# Patient Record
Sex: MALE | Race: WHITE | Employment: OTHER | URBAN - METROPOLITAN AREA
[De-identification: names, ages, dates, MRNs, and addresses within clinical notes are randomized per-mention and may not be internally consistent; named-entity substitution may affect disease eponyms.]

---

## 2019-12-10 ENCOUNTER — OFFICE VISIT (OUTPATIENT)
Dept: PHYSICAL MEDICINE AND REHAB | Age: 34
End: 2019-12-10
Payer: MEDICARE

## 2019-12-10 VITALS
BODY MASS INDEX: 26.01 KG/M2 | SYSTOLIC BLOOD PRESSURE: 104 MMHG | HEIGHT: 72 IN | DIASTOLIC BLOOD PRESSURE: 72 MMHG | WEIGHT: 192 LBS

## 2019-12-10 DIAGNOSIS — G89.29 CHRONIC BILATERAL THORACIC BACK PAIN: ICD-10-CM

## 2019-12-10 DIAGNOSIS — R25.2 SPASTICITY: ICD-10-CM

## 2019-12-10 DIAGNOSIS — N31.9 NEUROGENIC BLADDER: ICD-10-CM

## 2019-12-10 DIAGNOSIS — N32.81 OAB (OVERACTIVE BLADDER): ICD-10-CM

## 2019-12-10 DIAGNOSIS — F32.1 CURRENT MODERATE EPISODE OF MAJOR DEPRESSIVE DISORDER, UNSPECIFIED WHETHER RECURRENT (HCC): ICD-10-CM

## 2019-12-10 DIAGNOSIS — M54.42 CHRONIC BILATERAL LOW BACK PAIN WITH BILATERAL SCIATICA: Primary | ICD-10-CM

## 2019-12-10 DIAGNOSIS — Z79.899 HIGH RISK MEDICATION USE: ICD-10-CM

## 2019-12-10 DIAGNOSIS — G89.29 CHRONIC BILATERAL LOW BACK PAIN WITH BILATERAL SCIATICA: Primary | ICD-10-CM

## 2019-12-10 DIAGNOSIS — G89.29 CHRONIC PAIN OF BOTH SHOULDERS: ICD-10-CM

## 2019-12-10 DIAGNOSIS — G35 MULTIPLE SCLEROSIS (HCC): ICD-10-CM

## 2019-12-10 DIAGNOSIS — F17.200 SMOKER: ICD-10-CM

## 2019-12-10 DIAGNOSIS — M54.6 CHRONIC BILATERAL THORACIC BACK PAIN: ICD-10-CM

## 2019-12-10 DIAGNOSIS — E55.9 VITAMIN D DEFICIENCY: ICD-10-CM

## 2019-12-10 DIAGNOSIS — M79.10 MYALGIA: ICD-10-CM

## 2019-12-10 DIAGNOSIS — M54.41 CHRONIC BILATERAL LOW BACK PAIN WITH BILATERAL SCIATICA: Primary | ICD-10-CM

## 2019-12-10 DIAGNOSIS — M25.511 CHRONIC PAIN OF BOTH SHOULDERS: ICD-10-CM

## 2019-12-10 DIAGNOSIS — M25.512 CHRONIC PAIN OF BOTH SHOULDERS: ICD-10-CM

## 2019-12-10 PROBLEM — M54.40 CHRONIC BILATERAL LOW BACK PAIN WITH SCIATICA: Status: ACTIVE | Noted: 2019-12-10

## 2019-12-10 PROBLEM — R68.82 LOW LIBIDO: Status: ACTIVE | Noted: 2018-06-15

## 2019-12-10 PROCEDURE — 99205 OFFICE O/P NEW HI 60 MIN: CPT | Performed by: PHYSICAL MEDICINE & REHABILITATION

## 2019-12-10 RX ORDER — METHOCARBAMOL 500 MG/1
500 TABLET, FILM COATED ORAL
COMMUNITY
Start: 2019-08-21 | End: 2019-12-10

## 2019-12-10 RX ORDER — TIZANIDINE 4 MG/1
4 TABLET ORAL
COMMUNITY
Start: 2018-04-16 | End: 2019-12-10

## 2019-12-10 SDOH — HEALTH STABILITY: MENTAL HEALTH
STRESS IS WHEN SOMEONE FEELS TENSE, NERVOUS, ANXIOUS, OR CAN'T SLEEP AT NIGHT BECAUSE THEIR MIND IS TROUBLED. HOW STRESSED ARE YOU?: RATHER MUCH

## 2019-12-10 SDOH — ECONOMIC STABILITY: TRANSPORTATION INSECURITY
IN THE PAST 12 MONTHS, HAS THE LACK OF TRANSPORTATION KEPT YOU FROM MEDICAL APPOINTMENTS OR FROM GETTING MEDICATIONS?: YES

## 2019-12-10 SDOH — SOCIAL STABILITY: SOCIAL INSECURITY
WITHIN THE LAST YEAR, HAVE YOU BEEN KICKED, HIT, SLAPPED, OR OTHERWISE PHYSICALLY HURT BY YOUR PARTNER OR EX-PARTNER?: NO

## 2019-12-10 SDOH — SOCIAL STABILITY: SOCIAL NETWORK: IN A TYPICAL WEEK, HOW MANY TIMES DO YOU TALK ON THE PHONE WITH FAMILY, FRIENDS, OR NEIGHBORS?: TWICE A WEEK

## 2019-12-10 SDOH — SOCIAL STABILITY: SOCIAL NETWORK
DO YOU BELONG TO ANY CLUBS OR ORGANIZATIONS SUCH AS CHURCH GROUPS UNIONS, FRATERNAL OR ATHLETIC GROUPS, OR SCHOOL GROUPS?: NO

## 2019-12-10 SDOH — SOCIAL STABILITY: SOCIAL NETWORK: HOW OFTEN DO YOU ATTEND CHURCH OR RELIGIOUS SERVICES?: NEVER

## 2019-12-10 SDOH — ECONOMIC STABILITY: TRANSPORTATION INSECURITY
IN THE PAST 12 MONTHS, HAS LACK OF TRANSPORTATION KEPT YOU FROM MEETINGS, WORK, OR FROM GETTING THINGS NEEDED FOR DAILY LIVING?: YES

## 2019-12-10 SDOH — ECONOMIC STABILITY: FOOD INSECURITY: WITHIN THE PAST 12 MONTHS, YOU WORRIED THAT YOUR FOOD WOULD RUN OUT BEFORE YOU GOT MONEY TO BUY MORE.: SOMETIMES TRUE

## 2019-12-10 SDOH — ECONOMIC STABILITY: FOOD INSECURITY: WITHIN THE PAST 12 MONTHS, THE FOOD YOU BOUGHT JUST DIDN'T LAST AND YOU DIDN'T HAVE MONEY TO GET MORE.: SOMETIMES TRUE

## 2019-12-10 SDOH — SOCIAL STABILITY: SOCIAL INSECURITY
WITHIN THE LAST YEAR, HAVE TO BEEN RAPED OR FORCED TO HAVE ANY KIND OF SEXUAL ACTIVITY BY YOUR PARTNER OR EX-PARTNER?: NO

## 2019-12-10 SDOH — SOCIAL STABILITY: SOCIAL NETWORK: HOW OFTEN DO YOU GET TOGETHER WITH FRIENDS OR RELATIVES?: TWICE A WEEK

## 2019-12-10 SDOH — SOCIAL STABILITY: SOCIAL NETWORK: ARE YOU MARRIED, WIDOWED, DIVORCED, SEPARATED, NEVER MARRIED, OR LIVING WITH A PARTNER?: NEVER MARRIED

## 2019-12-10 SDOH — SOCIAL STABILITY: SOCIAL INSECURITY: WITHIN THE LAST YEAR, HAVE YOU BEEN HUMILIATED OR EMOTIONALLY ABUSED IN OTHER WAYS BY YOUR PARTNER OR EX-PARTNER?: NO

## 2019-12-10 SDOH — HEALTH STABILITY: PHYSICAL HEALTH: ON AVERAGE, HOW MANY DAYS PER WEEK DO YOU ENGAGE IN MODERATE TO STRENUOUS EXERCISE (LIKE A BRISK WALK)?: 3 DAYS

## 2019-12-10 SDOH — HEALTH STABILITY: MENTAL HEALTH: HOW OFTEN DO YOU HAVE A DRINK CONTAINING ALCOHOL?: NOT ASKED

## 2019-12-10 SDOH — SOCIAL STABILITY: SOCIAL INSECURITY: WITHIN THE LAST YEAR, HAVE YOU BEEN AFRAID OF YOUR PARTNER OR EX-PARTNER?: NO

## 2019-12-10 SDOH — HEALTH STABILITY: PHYSICAL HEALTH: ON AVERAGE, HOW MANY MINUTES DO YOU ENGAGE IN EXERCISE AT THIS LEVEL?: 10 MIN

## 2019-12-10 SDOH — SOCIAL STABILITY: SOCIAL NETWORK: HOW OFTEN DO YOU ATTENT MEETINGS OF THE CLUB OR ORGANIZATION YOU BELONG TO?: NEVER

## 2019-12-10 SDOH — ECONOMIC STABILITY: INCOME INSECURITY: HOW HARD IS IT FOR YOU TO PAY FOR THE VERY BASICS LIKE FOOD, HOUSING, MEDICAL CARE, AND HEATING?: VERY HARD

## 2019-12-10 ASSESSMENT — ENCOUNTER SYMPTOMS
NAUSEA: 0
WHEEZING: 0
ABDOMINAL PAIN: 0
BACK PAIN: 1
SHORTNESS OF BREATH: 0
CONSTIPATION: 0
CHEST TIGHTNESS: 0
ALLERGIC/IMMUNOLOGIC NEGATIVE: 1
DIARRHEA: 0
PHOTOPHOBIA: 0
EYE PAIN: 0
VISUAL CHANGE: 1
BOWEL INCONTINENCE: 0
VOMITING: 0
COUGH: 0

## 2019-12-12 ENCOUNTER — HOSPITAL ENCOUNTER (OUTPATIENT)
Dept: OCCUPATIONAL THERAPY | Age: 34
Setting detail: THERAPIES SERIES
End: 2019-12-12
Payer: MEDICARE

## 2019-12-12 LAB
AMPHETAMINES SCREEN BLOOD: NEGATIVE NG/ML
BARBITURATES SCREEN BLOOD: NEGATIVE NG/ML
BENZODIAZEPINES SCREEN BLOOD: NEGATIVE NG/ML
BUPRENORPHINE: NEGATIVE NG/ML
CANNABINOID SCREEN BLOOD: NEGATIVE NG/ML
COCAINE SCREEN BLOOD: NEGATIVE NG/ML
Lab: NORMAL
METHADONE SCREEN BLOOD: NEGATIVE NG/ML
METHAMPHETAMINES SERUM/ PLASMA: NEGATIVE NG/ML
OPIATES SCREEN BLOOD: NEGATIVE NG/ML
OXYCODONE: NEGATIVE NG/ML
PHENCYCLIDINE SCREEN BLOOD: NEGATIVE NG/ML

## 2019-12-18 ENCOUNTER — HOSPITAL ENCOUNTER (OUTPATIENT)
Dept: OCCUPATIONAL THERAPY | Age: 34
Setting detail: THERAPIES SERIES
Discharge: HOME | End: 2019-12-18
Payer: MEDICARE

## 2019-12-18 PROCEDURE — 97140 MANUAL THERAPY 1/> REGIONS: CPT

## 2019-12-18 PROCEDURE — 97166 OT EVAL MOD COMPLEX 45 MIN: CPT

## 2019-12-26 ENCOUNTER — HOSPITAL ENCOUNTER (OUTPATIENT)
Dept: OCCUPATIONAL THERAPY | Age: 34
Setting detail: THERAPIES SERIES
Discharge: HOME | End: 2019-12-26
Payer: MEDICARE

## 2019-12-26 PROCEDURE — 97140 MANUAL THERAPY 1/> REGIONS: CPT

## 2019-12-26 ASSESSMENT — PAIN SCALES - GENERAL: PAINLEVEL_OUTOF10: 3

## 2019-12-30 ENCOUNTER — HOSPITAL ENCOUNTER (OUTPATIENT)
Dept: OCCUPATIONAL THERAPY | Age: 34
Setting detail: THERAPIES SERIES
Discharge: HOME | End: 2019-12-30
Payer: MEDICARE

## 2019-12-30 PROCEDURE — 97140 MANUAL THERAPY 1/> REGIONS: CPT

## 2020-01-02 ENCOUNTER — OFFICE VISIT (OUTPATIENT)
Dept: PHYSICAL MEDICINE AND REHAB | Age: 35
End: 2020-01-02
Payer: MEDICARE

## 2020-01-02 VITALS
HEIGHT: 72 IN | DIASTOLIC BLOOD PRESSURE: 62 MMHG | BODY MASS INDEX: 26.41 KG/M2 | WEIGHT: 195 LBS | SYSTOLIC BLOOD PRESSURE: 98 MMHG

## 2020-01-02 PROBLEM — G57.93 NEUROPATHIC PAIN OF BOTH LEGS: Status: ACTIVE | Noted: 2020-01-02

## 2020-01-02 PROBLEM — Z79.899 HIGH RISK MEDICATION USE: Status: ACTIVE | Noted: 2020-01-02

## 2020-01-02 PROCEDURE — 99215 OFFICE O/P EST HI 40 MIN: CPT | Performed by: PHYSICAL MEDICINE & REHABILITATION

## 2020-01-02 PROCEDURE — G8427 DOCREV CUR MEDS BY ELIG CLIN: HCPCS | Performed by: PHYSICAL MEDICINE & REHABILITATION

## 2020-01-02 PROCEDURE — 4004F PT TOBACCO SCREEN RCVD TLK: CPT | Performed by: PHYSICAL MEDICINE & REHABILITATION

## 2020-01-02 PROCEDURE — G8419 CALC BMI OUT NRM PARAM NOF/U: HCPCS | Performed by: PHYSICAL MEDICINE & REHABILITATION

## 2020-01-02 PROCEDURE — G8484 FLU IMMUNIZE NO ADMIN: HCPCS | Performed by: PHYSICAL MEDICINE & REHABILITATION

## 2020-01-02 RX ORDER — OXYCODONE AND ACETAMINOPHEN 7.5; 325 MG/1; MG/1
1 TABLET ORAL 2 TIMES DAILY
Qty: 60 TABLET | Refills: 0 | Status: SHIPPED | OUTPATIENT
Start: 2020-01-02 | End: 2020-01-22 | Stop reason: SDUPTHER

## 2020-01-02 RX ORDER — OXYCODONE HYDROCHLORIDE AND ACETAMINOPHEN 5; 325 MG/1; MG/1
5 TABLET ORAL 2 TIMES DAILY
COMMUNITY
Start: 2019-12-19 | End: 2020-01-02 | Stop reason: ALTCHOICE

## 2020-01-02 RX ORDER — TOPIRAMATE 25 MG/1
25 TABLET ORAL NIGHTLY
Qty: 60 TABLET | Refills: 3 | Status: SHIPPED | OUTPATIENT
Start: 2020-01-02 | End: 2020-05-27 | Stop reason: SDUPTHER

## 2020-01-02 ASSESSMENT — ENCOUNTER SYMPTOMS
SHORTNESS OF BREATH: 0
ALLERGIC/IMMUNOLOGIC NEGATIVE: 1
VOMITING: 0
NAUSEA: 0
BOWEL INCONTINENCE: 0
CHEST TIGHTNESS: 0
VISUAL CHANGE: 1
DIARRHEA: 0
PHOTOPHOBIA: 0
WHEEZING: 0
EYE PAIN: 0
BACK PAIN: 1
COUGH: 0
ABDOMINAL PAIN: 0
CONSTIPATION: 0

## 2020-01-02 NOTE — PROGRESS NOTES
Subjective  Marlena Felty, 36 y.o. male presents today with:    Chief Complaint           Follow-up lab results normal     Medication Refill pt did not bring meds    Neurologic Problem MS pain-Percocets helping       67-year-old male with severe progressive MS unfortunately has degenerative joint and back pain in the mid and low back as well as neuropathic pain in his hands and feet. He has tried several things over the years including multiple medications steroids injections practic therapy. He has had occupational therapy and PT but no myofascial release. He seen by the [facility] and is in a study for tuSabri and therefore cannot have steroids. He has overused NSAIDs out of desperation to get relief in the past but is never overused any type of controlled medication. He has seen a medical marijuana practice in the past and trialed CBD and THC with very limited success regarding his pain or MS symptoms. The THC at one time did help his optic neuritis however there is no longer an issue for him. Tox screen was unremarkable because of his severe disease and palliative nature of the pain medications he was given a short-term dosing over the Nicolette holiday by covering physician he denies any overuse or abuse however he did not bring his medication and in the original bottles as instructed. He will be be given a warning if this happens again he may be taken off his medications. We had a long discussion about what types of injections may help we will avoid steroids due to trigger points in the left sciatic. We will also trial OT myofascial release check a tox screen and then consider medications to include possibly Topamax and or Percocet which is helped in the past.  He is also to seek counseling regarding his depression and adjustment to disability disorder. He will look in his insurance book an online to see who takes his insurance which is Medicaid and Medicare.   He again will check with his Spouse name: None    Number of children: None    Years of education: 15    Highest education level: Some college, no degree   Occupational History    Occupation: disabled dt MS   Social Needs    Financial resource strain: Very hard    Food insecurity:     Worry: Sometimes true     Inability: Sometimes true    Transportation needs:     Medical: Yes     Non-medical: Yes   Tobacco Use    Smoking status: Current Every Day Smoker     Packs/day: 1.00    Smokeless tobacco: Never Used   Substance and Sexual Activity    Alcohol use: Not Currently    Drug use: Never    Sexual activity: None   Lifestyle    Physical activity:     Days per week: 3 days     Minutes per session: 10 min    Stress: Rather much   Relationships    Social connections:     Talks on phone: Twice a week     Gets together: Twice a week     Attends Rastafarian service: Never     Active member of club or organization: No     Attends meetings of clubs or organizations: Never     Relationship status: Never     Intimate partner violence:     Fear of current or ex partner: No     Emotionally abused: No     Physically abused: No     Forced sexual activity: No   Other Topics Concern    None   Social History Narrative    Social/Functional History     on SSDI for MS    Lives in [facility] with his twin brother and long term friend-Madan. He still pays most the bills. He was taken advantage of by the situation. He grew up in [place] but is no longer Rastafarian and is agnostic if not in [place].    Still able to drive-but can't afford a car. 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 No family history on file. Allergies   Allergen Reactions    Prednisone Other (See Comments)     Cant have dt Lynn Alvarado study he is in with [facility]       Review of Systems   Constitutional: Positive for fatigue. Pupils are equal, round, and reactive to light. Neck:      Musculoskeletal: Normal range of motion and neck supple. No edema or neck rigidity. Thyroid: No thyromegaly. Vascular: No JVD. Trachea: No tracheal deviation. Cardiovascular:      Pulses: No decreased pulses. Pulmonary:      Effort: Pulmonary effort is normal. No tachypnea, bradypnea, accessory muscle usage or respiratory distress. Breath sounds: No wheezing. Chest:      Chest wall: No tenderness. Abdominal:      General: Bowel sounds are normal. There is no distension. Palpations: Abdomen is soft. There is no mass. Tenderness: There is no tenderness. There is no guarding or rebound. Musculoskeletal:         General: Tenderness present. Right shoulder: Normal.      Left shoulder: Normal.      Right elbow: Normal.     Left elbow: Normal.      Right wrist: Normal.      Left wrist: Normal.      Right hip: Normal.      Left hip: Normal.      Right knee: Normal.      Left knee: Normal.      Right ankle: Normal. Achilles tendon normal.      Left ankle: Normal. Achilles tendon normal.      Cervical back: He exhibits decreased range of motion, tenderness and bony tenderness. Thoracic back: He exhibits decreased range of motion, tenderness and bony tenderness. Lumbar back: He exhibits decreased range of motion, tenderness, bony tenderness and pain. He exhibits no swelling, no edema, no deformity, no laceration and normal pulse. Back:       Right upper arm: Normal.      Left upper arm: Normal.      Right forearm: Normal.      Left forearm: Normal.      Right hand: Normal.      Left hand: Normal.      Right upper leg: Normal.      Left upper leg: Normal.      Right lower leg: Normal.      Left lower leg: Normal.        Legs:       Right foot: Normal.      Left foot: Normal.      Comments: Tender areas are indicated by numbered spot         Lymphadenopathy:      Cervical: No cervical adenopathy.    Skin: General: Skin is warm and dry. Coloration: Skin is not pale. Findings: No abrasion, bruising, ecchymosis, erythema, laceration, petechiae or rash. Rash is not macular, pustular or urticarial.      Nails: There is no clubbing. Neurological:      Mental Status: He is alert and oriented to person, place, and time. Cranial Nerves: No cranial nerve deficit. Sensory: No sensory deficit. Motor: No tremor, atrophy or abnormal muscle tone. Coordination: Coordination normal. Finger-Nose-Finger Test abnormal.      Gait: Tandem walk abnormal. Gait normal.      Deep Tendon Reflexes: Reflexes abnormal. Babinski sign absent on the right side. Babinski sign absent on the left side. Reflex Scores:       Tricep reflexes are 1+ on the right side and 1+ on the left side. Bicep reflexes are 1+ on the right side and 1+ on the left side. Brachioradialis reflexes are 1+ on the right side and 1+ on the left side. Patellar reflexes are 3+ on the right side and 3+ on the left side. Achilles reflexes are 3+ on the right side and 3+ on the left side. Psychiatric:         Attention and Perception: He is attentive. Mood and Affect: Mood is not anxious or depressed. Affect is not labile, blunt, angry or inappropriate. Speech: He is communicative. Speech is not rapid and pressured, delayed, slurred or tangential.         Behavior: Behavior normal. Behavior is not agitated, slowed, aggressive, withdrawn, hyperactive or combative. Thought Content: Thought content normal. Thought content is not paranoid or delusional. Thought content does not include homicidal or suicidal ideation. Thought content does not include homicidal or suicidal plan. Cognition and Memory: Memory is not impaired. He does not exhibit impaired recent memory or impaired remote memory. Judgment: Judgment normal. Judgment is not impulsive or inappropriate.        Ortho Exam  Neurologic Exam     Mental Status   Oriented to person, place, and time. Follows 2 step commands. Attention: normal. Concentration: normal.   Speech: not slurred   Level of consciousness: alert  Knowledge: good. Able to name object. Able to read. Able to repeat. Able to write. Normal comprehension. Cranial Nerves     CN III, IV, VI   Pupils are equal, round, and reactive to light. Gait, Coordination, and Reflexes     Coordination   Finger to nose coordination: abnormal  Tandem walking coordination: abnormal    Reflexes   Right brachioradialis: 1+  Left brachioradialis: 1+  Right biceps: 1+  Left biceps: 1+  Right triceps: 1+  Left triceps: 1+  Right patellar: 3+  Left patellar: 3+  Right achilles: 3+  Left achilles: 3+          After a thorough review and discussion of the previous medical records, patient comprehensive medical, surgical, and family and social history, Review of Systems, their OARRS, their Screener and Opioid Assessment for Patients with Pain (SOAPP®-R), recent diagnostics, and symptomatic results to previous treatment, it is my impression that the patients is suffering with progressive and severe:     Diagnosis Orders   1. Multiple sclerosis (HCC)  oxyCODONE-acetaminophen (PERCOCET) 7.5-325 MG per tablet    topiramate (TOPAMAX) 25 MG tablet   2. Neurogenic bladder  topiramate (TOPAMAX) 25 MG tablet   3. Vitamin D deficiency     4. Spasticity     5. OAB (overactive bladder)     6. Current moderate episode of major depressive disorder, unspecified whether recurrent ([facility] [address]  oxyCODONE-acetaminophen (PERCOCET) 7.5-325 MG per tablet   7. Chronic pain of both shoulders  oxyCODONE-acetaminophen (PERCOCET) 7.5-325 MG per tablet    topiramate (TOPAMAX) 25 MG tablet   8. Chronic bilateral thoracic back pain  oxyCODONE-acetaminophen (PERCOCET) 7.5-325 MG per tablet   9. Chronic bilateral low back pain with bilateral sciatica  oxyCODONE-acetaminophen (PERCOCET) 7.5-325 MG per tablet   10. Neuropathic pain of both legs  topiramate (TOPAMAX) 25 MG tablet   11. High risk medication use  oxyCODONE-acetaminophen (PERCOCET) 7.5-325 MG per tablet    topiramate (TOPAMAX) 25 MG tablet       I am also concerned by lifestyle and mood issues including:    Past Medical History:   Diagnosis Date    Chronic bilateral low back pain with sciatica 12/10/2019    Mild L4-5 degenerative disc disease and minimal facet degenerative  changes at L5-S1.  Otherwise normal lumbosacral spine. Mild remote benign lower thoracic compression fractures and mild to  moderate thoracic degenerative disc disease.  Erectile dysfunction     Multiple sclerosis ([facility] [address] 12/10/2019    Sees [facility] Date of onset: August 2006-Left optic neuritis Date of diagnosis of MS: September 2006-Diagnosed by Dr. Charles Evans course at onset: Relapsing-Remitting Current disease course: Relapsing-Remitting Previous disease therapies:  IVMP August 2006 Copaxone-September 2006-January 2007 (stopped due to expense) IVMP July 2008 (optic neuritis on right) IVMP November 6-8, 2015 (Left hand weaknes    OAB (overactive bladder) 6/15/2018    Spasticity 9/15/2015           Given their medication, chronic pain and lifestyle and medications they are at risk for :    Falls, constipation, addiction  Loss of livelyhood due to severe pain, debility, weight gain and  vitamin D deficiency    The patient was educated regarding proper diet, fitness routine, and regulatory restrictions concerning pain medications. Previous notes, comprehensive past medical, surgical, family history, and diagnostics were reviewed. Patient education and councelling were provided regarding off label use,treatment options and medication and injection risks.     Current and old OARRS ([place] Automated Prescription Reporting System) records reviewed, all refills reviewed since last visit,  Behavioral agreement/RALPH regulations   and Toxicology screen was reviewed with patient and is up to oxyCODONE-acetaminophen (PERCOCET) 7.5-325 MG per tablet     Sig: Take 1 tablet by mouth 2 times daily for 30 days. Intended supply: 30 days     Dispense:  60 tablet     Refill:  0     Reduce doses taken as pain becomes manageable    topiramate (TOPAMAX) 25 MG tablet     Sig: Take 1 tablet by mouth nightly     Dispense:  60 tablet     Refill:  3        -which helps with pain and function. Otherwise, continue the current pain medications that I have prescibed. Radiologic:   Old films reviewed  IMPRESSION:    1.  Stable changes of multiple sclerosis. 2.  BRAIN: Severity: Mild-to-moderate.  Volume decrease: Mild.  New   plaques: None.  Enhancement: None. 3.  No acute intracranial disease. 4.  CORD: Severity: Moderate to severe.  Volume decrease: None.  New   plaques: None.  Enhancement: None. 5.  Patent cervical canal and neural foramina. Anatomic variant: None.  C2-3 is considered superior most disc. Assume 7   cervical-type vertebrae.,     I discussed results with patients. see Follow up plans below  For any new studies. Care Everywhere Updates:  requested and reviewed. IRB : A Randomized, Controlled, Open-Label, Rater-Blinded, Phase 3b Study of the Efficacy, Safety, and Tolerability of 6-Week Extended Interval Dosing of Natalizumab ([id number]) in Subjects With Relapsing-Remitting Multiple Sclerosis Switching From Treatment With 4-Week Natalizumab Standard Interval Dosing (RAJNI) in Relation to Continued RAJNI Treatment    : Dr. Marybel Isabel MD    Week 24, Day 168 (-2/+5)  1251 - /78, P 74, RR: 16, Tympanic Temp 36 C (Pre-dose vitals)  X Patient administered 300mg natalizumab IV  1253- Infusion start time. 1346 - Infusion stop time, started NaCl at 125 ml/hr to flush line. 1356 - End Administration Time. 20ml given of NaCl. 131 ml given of research medication    No new issues noted.           Electrodiagnostic:  Previous studies requested,     I discussed results with patient. See follow-up plans for new studies. Labs:  Previous labs reviewed     No results found for: NA, K, CL, CO2, BUN, CREATININE, CALCIUM, LABALBU, BILITOT, ALKPHOS, AST, ALT  No results found for: WBC, RBC, HGB, HCT, MCV, MCH, MCHC, RDW, PLT, MPV    Lab Results   Component Value Date    DSCOMMENT See Note 12/10/2019       Lab Results   Component Value Date    BUPREN Negative 12/10/2019       No results found for: METPHEN, PHENTERMINE, BENZOYL, ALPRAZ, ALPHAOHALPRA, CLONAZEPAM, 7AMINOCLONAZ, DIAZEP, SAWYER, OXAZ, TEMAZ, LORAZEPAM, MIDAZOLAM, ZOLPIDEM, GRAHAM, ETG, MARIJMET, PCP, PAINMGTDRUGP, EERPAINMGTPA, LABCREA      , I discussed results with patient. See follow-up plans for new studies. Therapies:  HEP-gentle stretching and relaxation techniques-demonstrated with patient-they are to do them twice a day. They are also advised to make the following lifestyle changes:  Goals      SOAPP-R      12/10/19 score: 17- moderate risk  01/02/2020 score: 16 - moderate risk             Injections or Epidurals:  Injection options were discussed. Patient gave verbal consent to ordered injections. See follow-up plans for planned injections. Supplements:  Vitamin D with increased dosing during the rainy months,   Education was given on:   Dietary and Fitness--daily stretches and low carb diet-in chair Yoga when possible             Follow up with Primary Care Physician regarding their general medical needs. They are to follow up in 2 months to review medication, efficacy of injections, pill counts, OARRS check, SOAPPR assessment, review diagnostics, to review previous and future treatment plans and assess appropriateness for continued therapy. New Diagnostics  No orders of the defined types were placed in this encounter.       Joy Gomez, DO

## 2020-01-03 ENCOUNTER — HOSPITAL ENCOUNTER (OUTPATIENT)
Dept: OCCUPATIONAL THERAPY | Age: 35
Setting detail: THERAPIES SERIES
Discharge: HOME | End: 2020-01-03
Payer: MEDICARE

## 2020-01-08 ENCOUNTER — HOSPITAL ENCOUNTER (OUTPATIENT)
Dept: OCCUPATIONAL THERAPY | Age: 35
Setting detail: THERAPIES SERIES
Discharge: HOME | End: 2020-01-08
Payer: MEDICARE

## 2020-01-08 PROCEDURE — 97140 MANUAL THERAPY 1/> REGIONS: CPT

## 2020-01-08 NOTE — PROGRESS NOTES
Occupational Therapy  Daily Note     Name: Renu Bellamy  : 1985  MRN: [id number]  Diagnosis: Chronic bilateral low back pain with bilateral sciatica     Visit Information:   OT Insurance Information: Medicare  Progress Note Due Date: 20  Progress Note Counter: 3/20    Date: 2020    OT Manual therapy 58 minutes for 4 unit(s)       OT Individual Minutes  Time In: 0059  Time Out: 4511  Minutes: 62    Referring Practitioner: Dr. Tomasz Busch:  Treatment started early. Patient reports, \"Nothing new. \"              Pain rating:     Pre-treatment pain:3/10       Pain after treatment: 3/10  \"same\"              Treatment focus on pain reduction and reduction of fascial restrictions. Provided direct treatment of MFR techniques after infrared heat treatment of 6J/30 seconds per site down the paraspinals.     Thoracic     Deep Release:    [] Anterior chest:   [x] Horizontal thoracic:     [] Pectoralis major            [] Right  [] Left    [] Both     [] Pectoralis minor           [] Right   [] Left   [] Both         [] Horizontal pectoral:   [] Vertical pectoral:     [x] Thoracic paraspinals            [] Right  [] Left   [x] Both:     [] Lateral Obliques            [] Right   [] Left    [] Both       [x] Soft tissue mobilization:       [] Bronson  [] Vertical stroke   [] Bear claw   [x] Skin roll:              Lower Extremity     [] Transverse plane (name area):  [] Soft tissue release (name area):    [x] Leg pull          [x] Right   [x] Left    [x] Both   [] Foot release (name area):        Deep Release:     [] Quadriceps  [] IT band    [] Hamstrings   [] Supra/Inferior patella   [] Anterior/Posterior calf [] Internal/External hip release         Lumbar/Pelvis/Sacral     [x] Deep release of lumbo/sacra area:   [] Scar releases (identify area):    [] Anterior/Posterior calf:  [] Soft tissue mobility:    [x] Dural tube release from pelvis:                                  [] Pelvic floor transv.plane:    [] Piriformis Release      [] Right  [] Left    [] Both                     [] Psoas Release       [] Right  [] Left    [] Both      [] Quadratus lumborum      [] Right  [] Left    [] Both                     [] Other:              Assessment:   Pt tolerated treatment well. Plan:   Continue POC    Collaborated with OTR on previous day regarding need for 30 day update. Goals:1 addressed. Long term goals  Time Frame for Long term goals : 2x/wk  for 20 visits   Long term goal 1:  Pt will report pain 4/10 or less during functional activities. Long term goal 2: Pt will be IND with all recommended HEP, adaptive techniques, and/or adaptive strategies. Long term goal 3: Pt will utilize proper body mechanics during transfer and I/ADL's to assist to decrease pain. Long term goal 4: Pt will report less sleep disturbances from pain to increase quality of sleep  Long term goal 5: Pt will pursue new leisure activities to decrease stress and pain.     ERICKA Bradley   1/8/2020  8:57 AM   Electronically signed by HALEY Bradley on 1/8/20 at 9:44 AM

## 2020-01-10 ENCOUNTER — HOSPITAL ENCOUNTER (OUTPATIENT)
Dept: OCCUPATIONAL THERAPY | Age: 35
Setting detail: THERAPIES SERIES
Discharge: HOME | End: 2020-01-10
Payer: MEDICARE

## 2020-01-10 PROCEDURE — 97140 MANUAL THERAPY 1/> REGIONS: CPT

## 2020-01-10 PROCEDURE — 97530 THERAPEUTIC ACTIVITIES: CPT

## 2020-01-10 NOTE — PROGRESS NOTES
Occupational Therapy  Daily Note     Name: Valeria Gr  : 1985  MRN: [id number]  Diagnosis: Chronic bilateral low back pain with bilateral sciatica     Visit Information:   OT Insurance Information: Medicare  Progress Note Due Date: 20  Progress Note Counter:     Date: 1/10/2020    OT Manual therapy 45 minutes for 3 unit(s)  OT Therapeutic activities 13 minutes for 1 unit(s)       OT Individual Minutes  Time In: 3096  Time Out: 1200  Minutes: 62    Referring Practitioner: Dr. Dimas Pleitez: Session started late d/t therapist over with previous pt. Pain rating:     Pre-treatment pain:  Pre Treatment Pain Screening  Pain at present: 3  Scale Used: Numeric Score  Comments / Details: lower back     Pain after treatment:      2/10         Focus of treatment was on the following:   assess for progress toward goals , deceasing fascial restrictions  and decreasing pain      Treatment Activity:     MFR: Thoracic: Thoracic paraspinals bilaterally  and Transverse plane respiratory diaphragm   Lower extremity: Leg pull bilaterally  and Transverse plane: B upper leg  Lumbar/Pelvis/Sacral: Quadratus lumborum bilaterally , Pelvic floor transverse plane and Psoas release bilaterally     Education: Provided pt hand out for body mechanics. Discussed different techniques for better transfers supine to sit to supine. Comments: Pt reports sleeping all over the place. Pt reports starts on side of back, however wakes up on stomach. Pt reported chiropractor instructed to \"stay off stomach at all costs\" for sleep. Pt stated typically uses pillow between legs during sleep. Pt stated future purchase of new mattress in next week or 2. Pt stated not really looking into new leisure activities. Trying to get back onto exercise bike at home. Pt stated not really having pain getting on and off bike, but \"things get stiff after 15-20 minutes. \" Pt stated not a lot to do when it is cold

## 2020-01-10 NOTE — PROGRESS NOTES
OCCUPATIONAL THERAPY PROGRESS NOTE     [x]  [address]  []  [facility]         [address], [address]          [place], [address] [place] [address]       Ph: [phone]      Ph: [phone]       Fax: [phone]         Fax: [phone]     [] Certification     [] Recertification     [] Plan of Care    [x] Progress Note        Date: 1/10/2020    To:Referring Practitioner: Dr. Kinsey Casanova           From: Reza Billy OTR/L  Patient: Arabella Lawson       : 1985  MRN: [id number]  Diagnosis:Diagnosis: Chronic bilateral low back pain with bilateral sciatica    Date of eval: 2019    Visit Information:   OT Insurance Information: Medicare  Progress Note Due Date: 20  Progress Note Counter:     Last POC date: n/a                               Assessment:    Goals Current/Discharge status  Met   Long term goal 1:  Pt will report pain 4/10 or less during functional activities. Pt reported pain about same, but notices some difference. [] Met  [x] Partially Met  [] Not Met   Long term goal 2: Pt will be IND with all recommended HEP, adaptive techniques, and/or adaptive strategies. Ongoing [] Met  [] Partially Met  [] Not Met   Long term goal 3: Pt will utilize proper body mechanics during transfer and I/ADL's to assist to decrease pain. Pt not utilizing log roll technique when transferring to mat table. Verbal cues to encourage to decrease twisting lower back. [] Met  [x] Partially Met  [] Not Met   Long term goal 4: Pt will report less sleep disturbances from pain to increase quality of sleep Pt stated in process of purchasing new mattress. [] Met  [] Partially Met  [x] Not Met   Long term goal 5: Pt will pursue new leisure activities to decrease stress and pain. Pt stated has not been looking into new activities.   [] Met  [] Partially Met  [x] Not Met                                    Patient Status: [x] Continue/Initate plan of Care                    []  Discharge OT         []  Additional visits requested, please re-certify for additional visits        Electronically signed by:  Electronically signed by ISAAC Stevenson/L on 1/11/2020 at 12:40 PM

## 2020-01-14 ENCOUNTER — PROCEDURE VISIT (OUTPATIENT)
Dept: PHYSICAL MEDICINE AND REHAB | Age: 35
End: 2020-01-14
Payer: MEDICARE

## 2020-01-14 ENCOUNTER — HOSPITAL ENCOUNTER (OUTPATIENT)
Dept: OCCUPATIONAL THERAPY | Age: 35
Setting detail: THERAPIES SERIES
Discharge: HOME | End: 2020-01-14
Payer: MEDICARE

## 2020-01-14 PROCEDURE — 97530 THERAPEUTIC ACTIVITIES: CPT

## 2020-01-14 PROCEDURE — 20553 NJX 1/MLT TRIGGER POINTS 3/>: CPT | Performed by: PHYSICAL MEDICINE & REHABILITATION

## 2020-01-14 RX ORDER — LIDOCAINE HYDROCHLORIDE 10 MG/ML
23 INJECTION, SOLUTION INFILTRATION; PERINEURAL ONCE
Status: COMPLETED | OUTPATIENT
Start: 2020-01-14 | End: 2020-01-14

## 2020-01-14 RX ORDER — CYANOCOBALAMIN 1000 UG/ML
1000 INJECTION INTRAMUSCULAR; SUBCUTANEOUS ONCE
Status: COMPLETED | OUTPATIENT
Start: 2020-01-14 | End: 2020-01-14

## 2020-01-14 RX ADMIN — CYANOCOBALAMIN 1000 MCG: 1000 INJECTION INTRAMUSCULAR; SUBCUTANEOUS at 14:47

## 2020-01-14 RX ADMIN — LIDOCAINE HYDROCHLORIDE 23 ML: 10 INJECTION, SOLUTION INFILTRATION; PERINEURAL at 14:47

## 2020-01-14 NOTE — PROGRESS NOTES
Patient here for trigger point injections. Patient taken back to exam room, and placed on drape locking stool. Areas to be injected marked appropriately, and cleansed with alcohol. 23cc of 1% Lidocaine, and 1 cc of B12 to be injected by provider.

## 2020-01-21 ENCOUNTER — HOSPITAL ENCOUNTER (OUTPATIENT)
Dept: OCCUPATIONAL THERAPY | Age: 35
Setting detail: THERAPIES SERIES
Discharge: HOME | End: 2020-01-21
Payer: MEDICARE

## 2020-01-21 NOTE — PROGRESS NOTES
Therapy                            Cancellation/No-show Note    Date: 2020  Patient Name: Serg Baugh    : 1985  (36 y.o.)     MRN: [id number]    Account #: [de-identified]       Canceled Appointment: 3    For today's appointment patient:  [x]  Cancelled  []  Rescheduled appointment  []  No-show   []  Called pt to remind of next appointment     Reason given by patient:  []  Patient ill  [x]  Conflicting appointment  []  No transportation    []  Conflict with work  []  No reason given  []  Other:      [x] Pt has future appointments scheduled, no follow up needed  [] Pt requests to be on hold.     Reason:   If > 2 weeks please discuss with therapist.  [] Therapist to call pt for follow up     Comments:       Signature: Electronically signed by GURINDER Montgomery on 20 at 11:35 AM

## 2020-01-22 ENCOUNTER — OFFICE VISIT (OUTPATIENT)
Dept: PHYSICAL MEDICINE AND REHAB | Age: 35
End: 2020-01-22
Payer: MEDICARE

## 2020-01-22 VITALS
DIASTOLIC BLOOD PRESSURE: 78 MMHG | SYSTOLIC BLOOD PRESSURE: 108 MMHG | HEIGHT: 72 IN | BODY MASS INDEX: 25.33 KG/M2 | WEIGHT: 187 LBS

## 2020-01-22 PROCEDURE — G8419 CALC BMI OUT NRM PARAM NOF/U: HCPCS | Performed by: PHYSICAL MEDICINE & REHABILITATION

## 2020-01-22 PROCEDURE — 4004F PT TOBACCO SCREEN RCVD TLK: CPT | Performed by: PHYSICAL MEDICINE & REHABILITATION

## 2020-01-22 PROCEDURE — 99215 OFFICE O/P EST HI 40 MIN: CPT | Performed by: PHYSICAL MEDICINE & REHABILITATION

## 2020-01-22 PROCEDURE — G8427 DOCREV CUR MEDS BY ELIG CLIN: HCPCS | Performed by: PHYSICAL MEDICINE & REHABILITATION

## 2020-01-22 PROCEDURE — G8484 FLU IMMUNIZE NO ADMIN: HCPCS | Performed by: PHYSICAL MEDICINE & REHABILITATION

## 2020-01-22 RX ORDER — OXYCODONE AND ACETAMINOPHEN 7.5; 325 MG/1; MG/1
1 TABLET ORAL 2 TIMES DAILY
Qty: 60 TABLET | Refills: 0 | Status: SHIPPED | OUTPATIENT
Start: 2020-02-01 | End: 2020-02-28 | Stop reason: SDUPTHER

## 2020-01-22 RX ORDER — BACLOFEN 10 MG/1
10 TABLET ORAL 3 TIMES DAILY PRN
Qty: 90 TABLET | Refills: 1 | Status: SHIPPED | OUTPATIENT
Start: 2020-01-22 | End: 2020-02-28 | Stop reason: SDUPTHER

## 2020-01-22 ASSESSMENT — ENCOUNTER SYMPTOMS
PHOTOPHOBIA: 0
VISUAL CHANGE: 1
VOMITING: 0
WHEEZING: 0
BACK PAIN: 1
NAUSEA: 0
BOWEL INCONTINENCE: 0
SHORTNESS OF BREATH: 0
CHEST TIGHTNESS: 0
CONSTIPATION: 0
ALLERGIC/IMMUNOLOGIC NEGATIVE: 1
ABDOMINAL PAIN: 0
EYE PAIN: 0
DIARRHEA: 0
COUGH: 0

## 2020-01-22 NOTE — PROGRESS NOTES
[employer] pre-law before optic neuritis--made it impossible for him to continue. History reviewed. No pertinent family history. Allergies   Allergen Reactions    Prednisone Other (See Comments)     Cant have hector [employer] study he is in with [facility]       Review of Systems   Constitutional: Positive for fatigue. Negative for activity change, appetite change, diaphoresis, fever, unexpected weight change and weight loss. HENT: Negative. Eyes: Negative for photophobia and pain. Respiratory: Negative for cough, chest tightness, shortness of breath and wheezing. Cardiovascular: Negative for chest pain, palpitations, leg swelling and near-syncope. Gastrointestinal: Negative for abdominal pain, bowel incontinence, constipation, diarrhea, nausea and vomiting. Endocrine: Negative. Genitourinary: Positive for bladder incontinence. Negative for pelvic pain. Musculoskeletal: Positive for arthralgias, back pain, joint swelling and myalgias. Negative for gait problem, neck pain and neck stiffness. Skin: Negative. Allergic/Immunologic: Negative. Neurological: Positive for tingling, focal weakness, weakness, numbness, paresthesias and loss of balance. Negative for dizziness, light-headedness and headaches. Hematological: Negative. Psychiatric/Behavioral: Positive for dysphoric mood. Negative for confusion, memory loss and suicidal ideas. The patient is not nervous/anxious. Objective    Vitals:    01/22/20 1553   BP: 108/78   Site: Left Upper Arm   Position: Sitting   Cuff Size: Small Adult   Weight: 187 lb (84.8 kg)   Height: 6' (1.829 m)     Pain Score: Two (With medication)       Physical Exam  Vitals signs reviewed. Constitutional:       General: He is not in acute distress. Appearance: He is well-developed. He is not ill-appearing, toxic-appearing or diaphoretic. Comments:         HENT:      Head: Normocephalic and atraumatic.       Right Ear: Hearing normal.      Left Ear: Hearing Thought content is not paranoid or delusional. Thought content does not include homicidal or suicidal ideation. Thought content does not include homicidal or suicidal plan. Cognition and Memory: Memory is not impaired. He does not exhibit impaired recent memory or impaired remote memory. Judgment: Judgment normal. Judgment is not impulsive or inappropriate. Ortho Exam  Neurologic Exam     Mental Status   Oriented to person, place, and time. Attention: normal. Concentration: normal.   Speech: not slurred   Level of consciousness: alert  Knowledge: good. Able to name object. Able to read. Able to repeat. Able to write. Normal comprehension. Cranial Nerves     CN III, IV, VI   Pupils are equal, round, and reactive to light. Gait, Coordination, and Reflexes     Coordination   Finger to nose coordination: abnormal  Tandem walking coordination: abnormal    Reflexes   Right brachioradialis: 1+  Left brachioradialis: 1+  Right biceps: 1+  Left biceps: 1+  Right triceps: 1+  Left triceps: 1+  Right patellar: 3+  Left patellar: 3+  Right achilles: 3+  Left achilles: 3+        After a thorough review and discussion of the previous medical records, patient comprehensive medical, surgical, and family and social history, Review of Systems, their OARRS, their Screener and Opioid Assessment for Patients with Pain (SOAPP®-R), recent diagnostics, and symptomatic results to previous treatment, it is my impression that the patients is suffering with progressive and severe:     Diagnosis Orders   1. Multiple sclerosis (HCC)  oxyCODONE-acetaminophen (PERCOCET) 7.5-325 MG per tablet    baclofen (LIORESAL) 10 MG tablet   2. Current moderate episode of major depressive disorder, unspecified whether recurrent ([address]  oxyCODONE-acetaminophen (PERCOCET) 7.5-325 MG per tablet   3. Chronic pain of both shoulders  oxyCODONE-acetaminophen (PERCOCET) 7.5-325 MG per tablet   4.  Chronic bilateral thoracic back pain Prescription Reporting System) records reviewed, all refills reviewed since last visit,  Behavioral agreement/[place] regulations   and Toxicology screen was reviewed with patient and is up to date. There are no current red flags. They are making good progress regarding pain relief, they are performing at a functional level regarding activities of daily living, family interactions and psychological functioning, they're not having any adverse effects or side effects from the current medications, and I see no findings of aberrant drug taking or addiction related behaviors. The patient is aware that they have a chronic pain condition and they may require opiates dosing for life. All efforts will be made to wean to the lowest effective dose. Other therapies for pain have not been effective including nonopiate medications. Injections and exercises are only partially effective. A Rx for Narcan was offered to help prevent accidental overdose. RX Monitoring 1/22/2020   Periodic Controlled Substance Monitoring Possible medication side effects, risk of tolerance/dependence & alternative treatments discussed. ;No signs of potential drug abuse or diversion identified. ;Assessed functional status. ;Obtaining appropriate analgesic effect of treatment. Chronic Pain > 50 MEDD Re-evaluated the status of the patient's underlying condition causing pain. ;Considered consultation with a specialist.;Obtained or confirmed \"Consent for Opioid Use\" on file. Periodic Controlled Substance Monitoring: Possible medication side effects, risk of tolerance/dependence & alternative treatments discussed., No signs of potential drug abuse or diversion identified. , Assessed functional status., Obtaining appropriate analgesic effect of treatment. (Sung Suresh, DO)       Patient is currently taking:       I am having Ines Taylor start on baclofen.  I am also having him maintain his vitamin D, natalizumab, topiramate, and oxyCODONE-acetaminophen. I also recommend the following Medications:    Orders Placed This Encounter   Medications    oxyCODONE-acetaminophen (PERCOCET) 7.5-325 MG per tablet     Sig: Take 1 tablet by mouth 2 times daily for 30 days. Intended supply: 30 days     Dispense:  60 tablet     Refill:  0     Reduce doses taken as pain becomes manageable    baclofen (LIORESAL) 10 MG tablet     Sig: Take 1 tablet by mouth 3 times daily as needed (Muscle spasms)     Dispense:  90 tablet     Refill:  1        -which helps with pain and function. Otherwise, continue the current pain medications that I have prescibed. Radiologic:   Old films reviewed  Stable changes of multiple sclerosis. 2.  BRAIN: Severity: Mild-to-moderate.  Volume decrease: Mild.  New   plaques: None.  Enhancement: None. 3.  No acute intracranial disease. 4.  CORD: Severity: Moderate to severe.  Volume decrease: None.  New   plaques: None.  Enhancement: None. 5.  Patent cervical canal and neural foramina. Anatomic variant: None.  C2-3 is considered superior most disc. Assume 7   cervical-type vertebrae.,     I discussed results with patients. see Follow up plans below  For any new studies. Care Everywhere Updates:  requested and reviewed. No new issues noted. Electrodiagnostic:  Previous studies requested,     I discussed results with patient. See follow-up plans for new studies.         Labs:  Previous labs reviewed     No results found for: NA, K, CL, CO2, BUN, CREATININE, CALCIUM, LABALBU, BILITOT, ALKPHOS, AST, ALT  No results found for: WBC, RBC, HGB, HCT, MCV, MCH, MCHC, RDW, PLT, MPV    Lab Results   Component Value Date    DSCOMMENT See Note 12/10/2019       Lab Results   Component Value Date    BUPREN Negative 12/10/2019       No results found for: METPHEN, PHENTERMINE, BENZOYL, ALPRAZ, ALPHAOHALPRA, CLONAZEPAM, 7AMINOCLONAZ, DIAZEP, SAWYER, OXAZ, TEMAZ, LORAZEPAM, MIDAZOLAM, ZOLPIDEM,

## 2020-01-24 ENCOUNTER — HOSPITAL ENCOUNTER (OUTPATIENT)
Dept: OCCUPATIONAL THERAPY | Age: 35
Setting detail: THERAPIES SERIES
Discharge: HOME | End: 2020-01-24
Payer: MEDICARE

## 2020-01-24 NOTE — PROGRESS NOTES
Therapy                            Cancellation/No-show Note    Date: 2020  Patient Name: Lisa Flower    : 1985  (36 y.o.)     MRN: [id number]    Account #: [de-identified]       Canceled Appointment: 4    For today's appointment patient:  [x]  Cancelled  []  Rescheduled appointment  []  No-show   []  Called pt to remind of next appointment     Reason given by patient:  []  Patient ill  []  Conflicting appointment  []  No transportation    []  Conflict with work  []  No reason given  [x]  Other: Pt wanted to be d/c from OT     [] Pt has future appointments scheduled, no follow up needed  [] Pt requests to be on hold.     Reason:   If > 2 weeks please discuss with therapist.  [] Therapist to call pt for follow up     Comments:       Signature: Electronically signed by GURINDER Berkowitz on 20 at 12:26 PM

## 2020-01-24 NOTE — PROGRESS NOTES
OCCUPATIONAL THERAPY DISCHARGE SUMMARY     [] [address]:     [address] [place], [place] [address].  [address]  Ph: [phone]   Fax: [phone] [] [facility] CENTER:  [place] [address] [address] [place] [address], [facility] [address]   Ph: [phone]   Fax: [phone]       Date: 2020    To:Referring Practitioner: Dr. Delaney Every             From: GURINDER Argueta  Patient: Basilio Wang       : 1985  MRN: [id number]  Diagnosis:Diagnosis: Chronic bilateral low back pain with bilateral sciatica    Date of eval: 2019    Visit Information:   OT Insurance Information: Medicare  Canceled Appointment: 4  Progress Note Counter:        Comments:  Pt being unexpectedly d/c from OT. Pt called on this date to cancel and asked to be d/c from OT. Pt stated no change in pain with MFR techniques during the limited OT sessions attended. Assessment:    Goals Current/Discharge status  Met   Long term goal 1:  Pt will report pain 4/10 or less during functional activities. Unable to assess [] Met  [] Partially Met  [] Not Met   Long term goal 2: Pt will be IND with all recommended HEP, adaptive techniques, and/or adaptive strategies. Unable to assess [] Met  [] Partially Met  [] Not Met   Long term goal 3: Pt will utilize proper body mechanics during transfer and I/ADL's to assist to decrease pain. Unable to assess [] Met  [] Partially Met  [] Not Met   Long term goal 4: Pt will report less sleep disturbances from pain to increase quality of sleep Unable to assess [] Met  [] Partially Met  [] Not Met   Long term goal 5: Pt will pursue new leisure activities to decrease stress and pain. Unable to assess [] Met  [] Partially Met  [] Not Met       Plan: D/C from OT    Thank you for referral of this patient. Electronically signed by:   GURINDER Argueta 2020 12:37 PM

## 2020-01-28 ENCOUNTER — APPOINTMENT (OUTPATIENT)
Dept: OCCUPATIONAL THERAPY | Age: 35
End: 2020-01-28
Payer: MEDICARE

## 2020-01-28 ENCOUNTER — PROCEDURE VISIT (OUTPATIENT)
Dept: PHYSICAL MEDICINE AND REHAB | Age: 35
End: 2020-01-28
Payer: MEDICARE

## 2020-01-28 PROCEDURE — 20552 NJX 1/MLT TRIGGER POINT 1/2: CPT | Performed by: PHYSICAL MEDICINE & REHABILITATION

## 2020-01-28 RX ORDER — CYANOCOBALAMIN 1000 UG/ML
1000 INJECTION INTRAMUSCULAR; SUBCUTANEOUS ONCE
Status: COMPLETED | OUTPATIENT
Start: 2020-01-28 | End: 2020-01-28

## 2020-01-28 RX ORDER — LIDOCAINE HYDROCHLORIDE 10 MG/ML
15 INJECTION, SOLUTION INFILTRATION; PERINEURAL ONCE
Status: COMPLETED | OUTPATIENT
Start: 2020-01-28 | End: 2020-01-28

## 2020-01-28 RX ADMIN — CYANOCOBALAMIN 1000 MCG: 1000 INJECTION INTRAMUSCULAR; SUBCUTANEOUS at 12:08

## 2020-01-28 RX ADMIN — LIDOCAINE HYDROCHLORIDE 15 ML: 10 INJECTION, SOLUTION INFILTRATION; PERINEURAL at 12:09

## 2020-01-31 ENCOUNTER — APPOINTMENT (OUTPATIENT)
Dept: OCCUPATIONAL THERAPY | Age: 35
End: 2020-01-31
Payer: MEDICARE

## 2020-02-09 ENCOUNTER — HOSPITAL ENCOUNTER (OUTPATIENT)
Dept: MRI IMAGING | Age: 35
Discharge: HOME | End: 2020-02-11
Payer: MEDICARE

## 2020-02-09 PROCEDURE — 6360000004 HC RX CONTRAST MEDICATION: Performed by: PHYSICAL MEDICINE & REHABILITATION

## 2020-02-09 PROCEDURE — 72157 MRI CHEST SPINE W/O & W/DYE: CPT

## 2020-02-09 PROCEDURE — A9579 GAD-BASE MR CONTRAST NOS,1ML: HCPCS | Performed by: PHYSICAL MEDICINE & REHABILITATION

## 2020-02-09 RX ADMIN — GADOTERIDOL 15 ML: 279.3 INJECTION, SOLUTION INTRAVENOUS at 10:38

## 2020-02-28 ENCOUNTER — OFFICE VISIT (OUTPATIENT)
Dept: PHYSICAL MEDICINE AND REHAB | Age: 35
End: 2020-02-28
Payer: MEDICARE

## 2020-02-28 VITALS
DIASTOLIC BLOOD PRESSURE: 64 MMHG | SYSTOLIC BLOOD PRESSURE: 100 MMHG | BODY MASS INDEX: 25.73 KG/M2 | WEIGHT: 190 LBS | HEIGHT: 72 IN

## 2020-02-28 PROCEDURE — 4004F PT TOBACCO SCREEN RCVD TLK: CPT | Performed by: PHYSICAL MEDICINE & REHABILITATION

## 2020-02-28 PROCEDURE — G8427 DOCREV CUR MEDS BY ELIG CLIN: HCPCS | Performed by: PHYSICAL MEDICINE & REHABILITATION

## 2020-02-28 PROCEDURE — G8484 FLU IMMUNIZE NO ADMIN: HCPCS | Performed by: PHYSICAL MEDICINE & REHABILITATION

## 2020-02-28 PROCEDURE — 99214 OFFICE O/P EST MOD 30 MIN: CPT | Performed by: PHYSICAL MEDICINE & REHABILITATION

## 2020-02-28 PROCEDURE — G8419 CALC BMI OUT NRM PARAM NOF/U: HCPCS | Performed by: PHYSICAL MEDICINE & REHABILITATION

## 2020-02-28 RX ORDER — OXYCODONE AND ACETAMINOPHEN 7.5; 325 MG/1; MG/1
1 TABLET ORAL 2 TIMES DAILY
Qty: 60 TABLET | Refills: 0 | Status: SHIPPED | OUTPATIENT
Start: 2020-02-29 | End: 2020-03-27 | Stop reason: SDUPTHER

## 2020-02-28 RX ORDER — BACLOFEN 10 MG/1
10 TABLET ORAL 3 TIMES DAILY PRN
Qty: 90 TABLET | Refills: 1 | Status: SHIPPED | OUTPATIENT
Start: 2020-02-28 | End: 2020-05-27 | Stop reason: SDUPTHER

## 2020-02-28 ASSESSMENT — ENCOUNTER SYMPTOMS
ABDOMINAL PAIN: 0
CHEST TIGHTNESS: 0
BACK PAIN: 1
PHOTOPHOBIA: 0
EYE PAIN: 0
VISUAL CHANGE: 1
ALLERGIC/IMMUNOLOGIC NEGATIVE: 1
VOMITING: 0
DIARRHEA: 0
CONSTIPATION: 0
SHORTNESS OF BREATH: 0
COUGH: 0
BOWEL INCONTINENCE: 0
WHEEZING: 0
NAUSEA: 0

## 2020-02-28 NOTE — PROGRESS NOTES
helped in the past.  He is also to  Continue counseling regarding his depression and adjustment to disability disorder. He will look in his insurance book an online to see who takes his insurance which is Medicaid and Medicare. He again will check with his insurance as to whether they provide online counseling. He will continue occupational therapy he will trial the Percocet at 7.5 325 but break it in half up to twice a day up to 2 pills a day. And he is scheduled for office injections as discussed previously he will have trigger point sciatic's and trigger points. Neurologic Problem   The patient's primary symptoms include an altered mental status, clumsiness, focal sensory loss, focal weakness, a loss of balance, a visual change and weakness. The patient's pertinent negatives include no memory loss, near-syncope or slurred speech. This is a chronic problem. The current episode started more than 1 year ago. The neurological problem developed insidiously. The problem has been gradually worsening since onset. There was no focality noted. Associated symptoms include back pain, bladder incontinence and fatigue. Pertinent negatives include no abdominal pain, auditory change, aura, bowel incontinence, chest pain, confusion, diaphoresis, dizziness, fever, headaches, light-headedness, nausea, neck pain, palpitations, shortness of breath or vomiting. Past treatments include walking, medication and acetaminophen. The treatment provided moderate relief. His past medical history is significant for mood changes. There is no history of a bleeding disorder, a clotting disorder, a CVA, dementia, head trauma, liver disease or seizures. Back Pain   This is a chronic problem. The current episode started more than 1 year ago. The problem occurs constantly. The problem is unchanged. The pain is present in the lumbar spine, sacro-iliac and thoracic spine. The quality of the pain is described as aching.  The pain is at a severity of 7/10. The symptoms are aggravated by lying down, position and coughing. Stiffness is present in the morning. Associated symptoms include bladder incontinence, leg pain, numbness, paresis, paresthesias, tingling and weakness. Pertinent negatives include no abdominal pain, bowel incontinence, chest pain, fever, headaches, pelvic pain, perianal numbness or weight loss. Risk factors include history of steroid use, lack of exercise and sedentary lifestyle. He has tried NSAIDs, walking, ice, muscle relaxant, chiropractic manipulation, heat, home exercises, analgesics and bed rest for the symptoms. The treatment provided moderate relief. Leg Pain    The incident occurred more than 1 week ago. There was no injury mechanism. The quality of the pain is described as aching. The pain is at a severity of 5/10. The pain is mild. The pain has been improving since onset. Associated symptoms include muscle weakness, numbness and tingling. He reports no foreign bodies present. The symptoms are aggravated by movement, palpation and weight bearing. He has tried rest, heat, elevation, acetaminophen, immobilization, ice, non-weight bearing and NSAIDs for the symptoms. The treatment provided moderate relief. Past Medical History:   Diagnosis Date    Chronic bilateral low back pain with sciatica 12/10/2019    Mild L4-5 degenerative disc disease and minimal facet degenerative  changes at L5-S1.  Otherwise normal lumbosacral spine. Mild remote benign lower thoracic compression fractures and mild to  moderate thoracic degenerative disc disease.     Erectile dysfunction     Multiple sclerosis ([facility] [address] 12/10/2019    Sees [facility] Date of onset: August 2006-Left optic neuritis Date of diagnosis of MS: September 2006-Diagnosed by Dr. Jaylene Walter course at onset: Relapsing-Remitting Current disease course: Relapsing-Remitting Previous disease therapies:  IVMP August 2006 Copaxone-September 2006-January 2007 (stopped due to expense) IVMP July 2008 (optic neuritis on right) IVMP November 6-8, 2015 (Left hand weaknes    OAB (overactive bladder) 6/15/2018    Spasticity 9/15/2015     No past surgical history on file. Social History     Socioeconomic History    Marital status: Single     Spouse name: Not on file    Number of children: Not on file    Years of education: 15    Highest education level: Some college, no degree   Occupational History    Occupation: disabled dt MS   Social Needs    Financial resource strain: Very hard    Food insecurity:     Worry: Sometimes true     Inability: Sometimes true    Transportation needs:     Medical: Yes     Non-medical: Yes   Tobacco Use    Smoking status: Current Every Day Smoker     Packs/day: 1.00    Smokeless tobacco: Never Used   Substance and Sexual Activity    Alcohol use: Not Currently    Drug use: Never    Sexual activity: Not on file   Lifestyle    Physical activity:     Days per week: 3 days     Minutes per session: 10 min    Stress: Rather much   Relationships    Social connections:     Talks on phone: Twice a week     Gets together: Twice a week     Attends [employer] service: Never     Active member of club or organization: No     Attends meetings of clubs or organizations: Never     Relationship status: Never     Intimate partner violence:     Fear of current or ex partner: No     Emotionally abused: No     Physically abused: No     Forced sexual activity: No   Other Topics Concern    Not on file   Social History Narrative    Social/Functional History     on SSDI for MS    Lives in [facility] with his twin brother and long term friend-Madan. He still pays most the bills. He was taken advantage of by the situation. He grew up in [employer] but is no longer [employer]ist and is agnostic if not in [place].    Still able to drive-but can't afford a car.     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 No family history on file. Allergies   Allergen Reactions    Prednisone Other (See Comments)     Cant have dt Sia Goode study he is in with [facility]       Review of Systems   Constitutional: Positive for fatigue. Negative for activity change, appetite change, diaphoresis, fever, unexpected weight change and weight loss. HENT: Negative. Eyes: Negative for photophobia and pain. Respiratory: Negative for cough, chest tightness, shortness of breath and wheezing. Cardiovascular: Negative for chest pain, palpitations, leg swelling and near-syncope. Gastrointestinal: Negative for abdominal pain, bowel incontinence, constipation, diarrhea, nausea and vomiting. Endocrine: Negative. Genitourinary: Positive for bladder incontinence. Negative for pelvic pain. Musculoskeletal: Positive for arthralgias, back pain, joint swelling and myalgias. Negative for gait problem, neck pain and neck stiffness. Skin: Negative. Allergic/Immunologic: Negative. Neurological: Positive for tingling, focal weakness, weakness, numbness, paresthesias and loss of balance. Negative for dizziness, light-headedness and headaches. Hematological: Negative. Psychiatric/Behavioral: Positive for dysphoric mood. Negative for confusion, memory loss and suicidal ideas. The patient is not nervous/anxious. Objective    There were no vitals filed for this visit. No data recorded     Physical Exam  Vitals signs reviewed. Constitutional:       General: He is not in acute distress. Appearance: He is well-developed. He is not ill-appearing, toxic-appearing or diaphoretic. Comments:         HENT:      Head: Normocephalic and atraumatic. Right Ear: Hearing normal.      Left Ear: Hearing normal.      Nose: Nose normal.      Mouth/Throat:      Mouth: No oral lesions. Dentition: Normal dentition.       Pharynx: No oropharyngeal Normal.      Right lower leg: Normal.      Left lower leg: Normal.        Legs:       Right foot: Normal.      Left foot: Normal.      Comments: Tender areas are indicated by numbered spot         Lymphadenopathy:      Cervical: No cervical adenopathy. Skin:     General: Skin is warm and dry. Coloration: Skin is not pale. Findings: No abrasion, bruising, ecchymosis, erythema, laceration, petechiae or rash. Rash is not macular, pustular or urticarial.      Nails: There is no clubbing. Neurological:      Mental Status: He is alert and oriented to person, place, and time. Cranial Nerves: No cranial nerve deficit. Sensory: No sensory deficit. Motor: No tremor, atrophy or abnormal muscle tone. Coordination: Coordination normal. Finger-Nose-Finger Test abnormal.      Gait: Tandem walk abnormal. Gait normal.      Deep Tendon Reflexes: Reflexes abnormal. Babinski sign absent on the right side. Babinski sign absent on the left side. Reflex Scores:       Tricep reflexes are 1+ on the right side and 1+ on the left side. Bicep reflexes are 1+ on the right side and 1+ on the left side. Brachioradialis reflexes are 1+ on the right side and 1+ on the left side. Patellar reflexes are 3+ on the right side and 3+ on the left side. Achilles reflexes are 3+ on the right side and 3+ on the left side. Psychiatric:         Attention and Perception: He is attentive. Mood and Affect: Mood is not anxious or depressed. Affect is not labile, blunt, angry or inappropriate. Speech: He is communicative. Speech is not rapid and pressured, delayed, slurred or tangential.         Behavior: Behavior normal. Behavior is not agitated, slowed, aggressive, withdrawn, hyperactive or combative. Thought Content: Thought content normal. Thought content is not paranoid or delusional. Thought content does not include homicidal or suicidal ideation.  Thought content does not include homicidal or suicidal plan. Cognition and Memory: Memory is not impaired. He does not exhibit impaired recent memory or impaired remote memory. Judgment: Judgment normal. Judgment is not impulsive or inappropriate. Ortho Exam  Neurologic Exam     Mental Status   Oriented to person, place, and time. Speech: not slurred     Cranial Nerves     CN III, IV, VI   Pupils are equal, round, and reactive to light. Gait, Coordination, and Reflexes     Coordination   Finger to nose coordination: abnormal  Tandem walking coordination: abnormal    Reflexes   Right brachioradialis: 1+  Left brachioradialis: 1+  Right biceps: 1+  Left biceps: 1+  Right triceps: 1+  Left triceps: 1+  Right patellar: 3+  Left patellar: 3+  Right achilles: 3+  Left achilles: 3+        After a thorough review and discussion of the previous medical records, patient comprehensive medical, surgical, and family and social history, Review of Systems, their OARRS, their Screener and Opioid Assessment for Patients with Pain (SOAPP®-R), recent diagnostics, and symptomatic results to previous treatment, it is my impression that the patients is suffering with progressive and severe:     Diagnosis Orders   1. Chronic bilateral thoracic back pain  oxyCODONE-acetaminophen (PERCOCET) 7.5-325 MG per tablet    baclofen (LIORESAL) 10 MG tablet   2. Chronic pain of both shoulders  oxyCODONE-acetaminophen (PERCOCET) 7.5-325 MG per tablet   3. Vitamin D deficiency     4. Neuropathic pain of both legs     5. High risk medication use  oxyCODONE-acetaminophen (PERCOCET) 7.5-325 MG per tablet    baclofen (LIORESAL) 10 MG tablet   6. Current moderate episode of major depressive disorder, unspecified whether recurrent ([facility] [address]  oxyCODONE-acetaminophen (PERCOCET) 7.5-325 MG per tablet   7. Multiple sclerosis (HCC)  oxyCODONE-acetaminophen (PERCOCET) 7.5-325 MG per tablet    baclofen (LIORESAL) 10 MG tablet   8.  Chronic bilateral low back pain with bilateral sciatica  oxyCODONE-acetaminophen (PERCOCET) 7.5-325 MG per tablet    baclofen (LIORESAL) 10 MG tablet    External Referral to Neurosurgery       I am also concerned by lifestyle and mood issues including:    Past Medical History:   Diagnosis Date    Chronic bilateral low back pain with sciatica 12/10/2019    Mild L4-5 degenerative disc disease and minimal facet degenerative  changes at L5-S1.  Otherwise normal lumbosacral spine. Mild remote benign lower thoracic compression fractures and mild to  moderate thoracic degenerative disc disease.  Erectile dysfunction     Multiple sclerosis ([facility] [address] 12/10/2019    Sees [facility] Date of onset: August 2006-Left optic neuritis Date of diagnosis of MS: September 2006-Diagnosed by Dr. Gregoria Schilder course at onset: Relapsing-Remitting Current disease course: Relapsing-Remitting Previous disease therapies:  IVMP August 2006 Copaxone-September 2006-January 2007 (stopped due to expense) IVMP July 2008 (optic neuritis on right) IVMP November 6-8, 2015 (Left hand weaknes    OAB (overactive bladder) 6/15/2018    Spasticity 9/15/2015           Given their medication, chronic pain and lifestyle and medications they are at risk for :    Falls, constipation, addiction  Loss of livelyhood due to severe pain, debility, weight gain and  vitamin D deficiency    The patient was educated regarding proper diet, fitness routine, and regulatory restrictions concerning pain medications. Previous notes, comprehensive past medical, surgical, family history, and diagnostics were reviewed. Patient education and councelling were provided regarding off label use,treatment options and medication and injection risks. Current and old OARRS ([address] [place] [address] Automated Prescription Reporting System) records reviewed, all refills reviewed since last visit,  Behavioral agreement/RALPH regulations   and Toxicology screen was reviewed with patient and is up to date.       There are no current red flags.   They are making good progress regarding pain relief, they are performing at a functional level regarding activities of daily living, family interactions and psychological functioning, they're not having any adverse effects or side effects from the current medications, and I see no findings of aberrant drug taking or addiction related behaviors. The patient is aware that they have a chronic pain condition and they may require opiates dosing for life. All efforts will be made to wean to the lowest effective dose. Other therapies for pain have not been effective including nonopiate medications. Injections and exercises are only partially effective. A Rx for Narcan was offered to help prevent accidental overdose. RX Monitoring 1/22/2020   Periodic Controlled Substance Monitoring Possible medication side effects, risk of tolerance/dependence & alternative treatments discussed. ;No signs of potential drug abuse or diversion identified. ;Assessed functional status. ;Obtaining appropriate analgesic effect of treatment. Chronic Pain > 50 MEDD Re-evaluated the status of the patient's underlying condition causing pain. ;Considered consultation with a specialist.;Obtained or confirmed \"Consent for Opioid Use\" on file. Patient is currently taking:       I am having Jes Ross Pap maintain his vitamin D, natalizumab, topiramate, oxyCODONE-acetaminophen, and baclofen. I also recommend the following Medications:    Orders Placed This Encounter   Medications    oxyCODONE-acetaminophen (PERCOCET) 7.5-325 MG per tablet     Sig: Take 1 tablet by mouth 2 times daily for 30 days.  Intended supply: 30 days     Dispense:  60 tablet     Refill:  0     Reduce doses taken as pain becomes manageable    baclofen (LIORESAL) 10 MG tablet     Sig: Take 1 tablet by mouth 3 times daily as needed (Muscle spasms)     Dispense:  90 tablet     Refill:  1        -which helps with pain and function. Otherwise, continue the current pain medications that I have prescibed. Radiologic:   Old films reviewed markable normal thoracic spine MRI no DDD DJD or lesions consistent with MS flare.,     I discussed results with patients. see Follow up plans below  For any new studies. Care Everywhere Updates:  requested and reviewed. No new issues noted. Electrodiagnostic:  Previous studies requested,     I discussed results with patient. See follow-up plans for new studies. Labs:  Previous labs reviewed     No results found for: NA, K, CL, CO2, BUN, CREATININE, CALCIUM, LABALBU, BILITOT, ALKPHOS, AST, ALT  No results found for: WBC, RBC, HGB, HCT, MCV, MCH, MCHC, RDW, PLT, MPV    Lab Results   Component Value Date    DSCOMMENT See Note 12/10/2019       Lab Results   Component Value Date    BUPREN Negative 12/10/2019       No results found for: METPHEN, PHENTERMINE, BENZOYL, ALPRAZ, ALPHAOHALPRA, CLONAZEPAM, 7AMINOCLONAZ, DIAZEP, SAWYER, OXAZ, TEMAZ, LORAZEPAM, MIDAZOLAM, ZOLPIDEM, GRAHAM, ETG, MARIJMET, PCP, PAINMGTDRUGP, EERPAINMGTPA, LABCREA      , I discussed results with patient. See follow-up plans for new studies. Therapies:  HEP-gentle stretching and relaxation techniques-demonstrated with patient-they are to do them twice a day. They are also advised to make the following lifestyle changes:  Goals      SOAPP-R      12/10/19 score: 17- moderate risk  01/02/2020 score: 16 - moderate risk   1/22/20 score: 15- moderarte risk  2/28/20 score: 14- moderate risk              Injections or Epidurals:  Injection options were discussed. As needed  Patient gave verbal consent to ordered injections. See follow-up plans for planned injections.     Supplements:  Vitamin D with increased dosing during the rainy months,   Education was given on:   Dietary and Fitness--daily stretches and low carb diet-in chair Yoga when possible             Follow up with Primary Care Physician regarding their general medical needs. Stressed the importance of following up with PCP and specialists for his/her chronic diseases, health, CV, and cancer screening and continued care. Will follow disease activity/progression and adjust therapeutic regimen to disease activity and severity. Discussed medication dosage, usage, goals of therapy, and side effects. Available test results were reviewed   An additional 20 minutes were spent outside of the patient visit to review records. Additional time spent with the patient to discuss their questions. Additional time spent with the patient devoted to discussing treatment strategy, planning, and implementation. Discussed findings, impression and plan with patient. Patient understands above plan; questions asked and answered. Patient agrees to plan as noted above. F/U in 2-3months  At least 50% of the visit was involved in the discussion of the options for treatment. We discussed exercises, medication, interventional therapies and surgery. Healthy life style is essential with patient hard work to achieve the wellness. In addition; discussion with the patient and/or family about any of the diagnostic results, impressions and/or recommended diagnostic studies, prognosis, risks and benefits of treatment options, instructions for treatment and/or follow-up, importance of compliance with chosen treatment options, risk-factor reduction, and patient/family education. They are to follow up in 2 months to review medication, efficacy of injections, pill counts, OARRS check, SOAPPR assessment, review diagnostics, to review previous and future treatment plans and assess appropriateness for continued therapy. New Diagnostics  Orders Placed This Encounter   Procedures    External Referral to Neurosurgery   at [facility]  Also follow-up as scheduled with the [facility] MS clinic.       Brice Painting DO

## 2020-03-27 RX ORDER — OXYCODONE AND ACETAMINOPHEN 7.5; 325 MG/1; MG/1
1 TABLET ORAL 2 TIMES DAILY
Qty: 60 TABLET | Refills: 0 | Status: SHIPPED | OUTPATIENT
Start: 2020-03-29 | End: 2020-03-27 | Stop reason: SDUPTHER

## 2020-03-27 RX ORDER — OXYCODONE AND ACETAMINOPHEN 7.5; 325 MG/1; MG/1
1 TABLET ORAL 2 TIMES DAILY
Qty: 60 TABLET | Refills: 0 | Status: SHIPPED | OUTPATIENT
Start: 2020-04-27 | End: 2020-05-27 | Stop reason: SDUPTHER

## 2020-05-27 ENCOUNTER — OFFICE VISIT (OUTPATIENT)
Dept: PHYSICAL MEDICINE AND REHAB | Age: 35
End: 2020-05-27
Payer: MEDICARE

## 2020-05-27 VITALS — BODY MASS INDEX: 24.79 KG/M2 | WEIGHT: 183 LBS | HEIGHT: 72 IN

## 2020-05-27 PROCEDURE — 4004F PT TOBACCO SCREEN RCVD TLK: CPT | Performed by: PHYSICAL MEDICINE & REHABILITATION

## 2020-05-27 PROCEDURE — G8427 DOCREV CUR MEDS BY ELIG CLIN: HCPCS | Performed by: PHYSICAL MEDICINE & REHABILITATION

## 2020-05-27 PROCEDURE — G8420 CALC BMI NORM PARAMETERS: HCPCS | Performed by: PHYSICAL MEDICINE & REHABILITATION

## 2020-05-27 PROCEDURE — 99215 OFFICE O/P EST HI 40 MIN: CPT | Performed by: PHYSICAL MEDICINE & REHABILITATION

## 2020-05-27 RX ORDER — OXYCODONE AND ACETAMINOPHEN 7.5; 325 MG/1; MG/1
1 TABLET ORAL EVERY 8 HOURS PRN
Qty: 80 TABLET | Refills: 0 | Status: SHIPPED | OUTPATIENT
Start: 2020-05-27 | End: 2020-06-24 | Stop reason: SDUPTHER

## 2020-05-27 RX ORDER — TOPIRAMATE 25 MG/1
25 TABLET ORAL NIGHTLY
Qty: 60 TABLET | Refills: 3 | Status: SHIPPED | OUTPATIENT
Start: 2020-05-27 | End: 2020-09-01

## 2020-05-27 RX ORDER — BACLOFEN 10 MG/1
10 TABLET ORAL 3 TIMES DAILY PRN
Qty: 90 TABLET | Refills: 1 | Status: SHIPPED | OUTPATIENT
Start: 2020-05-27 | End: 2020-09-01

## 2020-05-27 ASSESSMENT — ENCOUNTER SYMPTOMS
SHORTNESS OF BREATH: 0
WHEEZING: 0
CONSTIPATION: 0
DIARRHEA: 0
COUGH: 0
BACK PAIN: 1
ABDOMINAL PAIN: 0
NAUSEA: 0
CHEST TIGHTNESS: 0
EYE PAIN: 0
VOMITING: 0
ALLERGIC/IMMUNOLOGIC NEGATIVE: 1
VISUAL CHANGE: 1
PHOTOPHOBIA: 0
BOWEL INCONTINENCE: 0

## 2020-05-27 NOTE — PROGRESS NOTES
This Encounter   Medications    baclofen (LIORESAL) 10 MG tablet     Sig: Take 1 tablet by mouth 3 times daily as needed (Muscle spasms)     Dispense:  90 tablet     Refill:  1    oxyCODONE-acetaminophen (PERCOCET) 7.5-325 MG per tablet     Sig: Take 1 tablet by mouth every 8 hours as needed for Pain (max of 80 per month) for up to 30 days. Intended supply: 30 days     Dispense:  80 tablet     Refill:  0     Reduce doses taken as pain becomes manageable    topiramate (TOPAMAX) 25 MG tablet     Sig: Take 1 tablet by mouth nightly     Dispense:  60 tablet     Refill:  3        -which helps with pain and function. Otherwise, continue the current pain medications that I have prescibed. Radiologic:   Old films reviewed    MRI OF THE THORACIC SPINE WITHOUT AND WITH GADOLINIUM.     HISTORY: Mid back pain. History of multiple sclerosis.       COMPARISON: No prior imaging of the spine available for correlation.       TECHNIQUE: Sagittal T1, T2, and inversion recovery. Axial T1 and T2. Sagittal and axial T1 post gadolinium (15 mL of ProHance) with fat suppression. Sagittal T1 whole spine localizer.       FINDINGS:       Vertebral body heights and alignment are maintained. No compression deformities, bone bruising or marrow replacing process. Posterior elements unremarkable.       Disc spaces are essentially unremarkable with very subtle anterior spurring at T3-4 and T5-6. Early Schmorl's node formation at the T8 though T10 disc levels.       Thoracic cord has a normal signal and morphology with no sign of focal areas of increased signal intensity to indicate demyelinating plaques. After the administration of contrast there is no sign of focal cord, intradural extra-axial, or extradural    enhancement on the sagittal image. Axial images are compromised by motion.  There is cerebrospinal fluid pulsation artifact posterior to the cord on the long TR sequences.           Impression       Essentially unremarkable MRI

## 2020-05-29 RX ORDER — VARENICLINE TARTRATE 25 MG
KIT ORAL
Qty: 1 BOX | Refills: 0 | Status: SHIPPED | OUTPATIENT
Start: 2020-05-29 | End: 2020-12-14 | Stop reason: SDUPTHER

## 2020-06-25 RX ORDER — OXYCODONE AND ACETAMINOPHEN 7.5; 325 MG/1; MG/1
1 TABLET ORAL EVERY 8 HOURS PRN
Qty: 80 TABLET | Refills: 0 | Status: SHIPPED | OUTPATIENT
Start: 2020-06-25 | End: 2020-07-24 | Stop reason: SDUPTHER

## 2020-07-08 ENCOUNTER — OFFICE VISIT (OUTPATIENT)
Dept: PHYSICAL MEDICINE AND REHAB | Age: 35
End: 2020-07-08
Payer: MEDICARE

## 2020-07-08 PROCEDURE — 95912 NRV CNDJ TEST 11-12 STUDIES: CPT | Performed by: PHYSICAL MEDICINE & REHABILITATION

## 2020-07-08 PROCEDURE — 95886 MUSC TEST DONE W/N TEST COMP: CPT | Performed by: PHYSICAL MEDICINE & REHABILITATION

## 2020-07-24 RX ORDER — OXYCODONE AND ACETAMINOPHEN 7.5; 325 MG/1; MG/1
1 TABLET ORAL EVERY 8 HOURS PRN
Qty: 80 TABLET | Refills: 0 | Status: SHIPPED | OUTPATIENT
Start: 2020-07-24 | End: 2020-08-21 | Stop reason: SDUPTHER

## 2020-08-21 ENCOUNTER — TELEPHONE (OUTPATIENT)
Dept: PHYSICAL MEDICINE AND REHAB | Age: 35
End: 2020-08-21

## 2020-08-21 PROBLEM — I83.90 VARICOSE VEINS OF LOWER EXTREMITY: Status: ACTIVE | Noted: 2020-08-21

## 2020-08-21 PROBLEM — I73.00 RAYNAUD'S DISEASE: Status: ACTIVE | Noted: 2020-08-21

## 2020-08-21 RX ORDER — OXYCODONE AND ACETAMINOPHEN 7.5; 325 MG/1; MG/1
1 TABLET ORAL EVERY 8 HOURS PRN
Qty: 80 TABLET | Refills: 0 | Status: SHIPPED | OUTPATIENT
Start: 2020-08-22 | End: 2020-09-01 | Stop reason: SDUPTHER

## 2020-08-21 NOTE — TELEPHONE ENCOUNTER
----- Message from Solomon Mary DO sent at 8/21/2020  7:47 AM EDT -----  Regarding: RE: Medication Request  Advise no early fills do not exceed prescribed dose of Percocet. Overtaking and dangers him from coming completely off the medication. We will discuss possible gabapentin at next visit try to move up his next visit.    ----- Message -----  From: Brunilda Rodríguez MA  Sent: 8/20/2020   3:49 PM EDT  To: Solomon Mary DO  Subject: Medication Request                               Patient requesting early fill of Percocet 7.5MG prescribed once every 8 hrs prn #80. Patient states that he has been taking 4 pills per day due to increasing pain that radiates from his lower back and down the left leg. Patient states that he ran out of his pain medication on 8/14/20. Next fill due 8/23/20. Patient also states that he has been taking Gabapentin that his mom has been supplying him. Patient has also discontinued use of Topamax as he states that it is not helping him with his pain. Patient is scheduled for BLE EMG 8/26/20 & F/U appointment 9/1/20. Please advise.

## 2020-08-26 ENCOUNTER — OFFICE VISIT (OUTPATIENT)
Dept: PHYSICAL MEDICINE AND REHAB | Age: 35
End: 2020-08-26
Payer: MEDICARE

## 2020-08-26 PROCEDURE — 95886 MUSC TEST DONE W/N TEST COMP: CPT | Performed by: PHYSICAL MEDICINE & REHABILITATION

## 2020-08-26 PROCEDURE — 95913 NRV CNDJ TEST 13/> STUDIES: CPT | Performed by: PHYSICAL MEDICINE & REHABILITATION

## 2020-09-01 ENCOUNTER — VIRTUAL VISIT (OUTPATIENT)
Dept: PHYSICAL MEDICINE AND REHAB | Age: 35
End: 2020-09-01
Payer: MEDICARE

## 2020-09-01 PROCEDURE — 99214 OFFICE O/P EST MOD 30 MIN: CPT | Performed by: PHYSICAL MEDICINE & REHABILITATION

## 2020-09-01 PROCEDURE — G8420 CALC BMI NORM PARAMETERS: HCPCS | Performed by: PHYSICAL MEDICINE & REHABILITATION

## 2020-09-01 PROCEDURE — G8427 DOCREV CUR MEDS BY ELIG CLIN: HCPCS | Performed by: PHYSICAL MEDICINE & REHABILITATION

## 2020-09-01 PROCEDURE — 4004F PT TOBACCO SCREEN RCVD TLK: CPT | Performed by: PHYSICAL MEDICINE & REHABILITATION

## 2020-09-01 RX ORDER — METHOCARBAMOL 500 MG/1
500 TABLET, FILM COATED ORAL 2 TIMES DAILY PRN
Qty: 60 TABLET | Refills: 1 | Status: SHIPPED | OUTPATIENT
Start: 2020-09-01 | End: 2020-09-11

## 2020-09-01 RX ORDER — GABAPENTIN 100 MG/1
100 CAPSULE ORAL 3 TIMES DAILY PRN
Qty: 90 CAPSULE | Refills: 2 | Status: SHIPPED | OUTPATIENT
Start: 2020-09-01 | End: 2020-10-22 | Stop reason: SINTOL

## 2020-09-01 RX ORDER — OXYCODONE AND ACETAMINOPHEN 7.5; 325 MG/1; MG/1
1 TABLET ORAL EVERY 8 HOURS PRN
Qty: 90 TABLET | Refills: 0 | Status: SHIPPED | OUTPATIENT
Start: 2020-09-01 | End: 2020-09-17 | Stop reason: SDUPTHER

## 2020-09-01 ASSESSMENT — ENCOUNTER SYMPTOMS
NAUSEA: 0
VOMITING: 0
SHORTNESS OF BREATH: 0
BOWEL INCONTINENCE: 0
VISUAL CHANGE: 1
BACK PAIN: 1
ABDOMINAL PAIN: 0

## 2020-09-01 NOTE — PROGRESS NOTES
TELEHEALTH EVALUATION -- Audio/Visual (During BXQJJ-05 public health emergency)    Due to COVID 19 outbreak, patient's office visit was converted to a virtual visit. Patient was contacted and agreed to proceed with a virtual visit via [employer]. me  The risks and benefits of converting to a virtual visit were discussed in light of the current infectious disease epidemic. Patient also understood that insurance coverage and co-pays are up to their individual insurance plans. Subjective       This patient has requested an audio/video evaluation for the following concern(s):    HPI:      78-year-old male here again to follow-up for pain due to severe progressive MS unfortunately has degenerative joint and back pain in the mid and low back as well as neuropathic pain in his hands and feet. He has tried several things over the years including multiple medications steroids injections practic therapy. He has had occupational therapy and PT but no myofascial release. He seen by the [facility] and is in a study for [employer] and therefore cannot have steroids. He has overused NSAIDs out of desperation to get relief in the past but is never overused any type of controlled medication. He has seen a medical marijuana practice in the past and trialed CBD and THC with very limited success regarding his pain or MS symptoms. The THC at one time did help his optic neuritis however there is no longer an issue for him. His recent tox screen was unremarkable and he is doing well on the low-dose Percocet with Topamax except having significant back spasms and I discussed trialing baclofen. We will continue with trigger point injections which seem to be helping.     I have okayed him to transition to 3 a day of the Percocet and that will be his maximal dose he is tried baclofen without help Topamax without help injections without help he is tried Neurontin and it makes him feel little loopy we discussed taking possible little lower dose only at night. He recently had EMG studies which showed the multiple sclerosis and significant slowing at the carpal tunnel consistent with carpal tunnel syndrome and or the MS and low amplitudes in the lower extremity consistent with the MS and L5-S1 radiculopathy possibly overlying the MS. Neurologic Problem   The patient's primary symptoms include an altered mental status, clumsiness, focal sensory loss, focal weakness, a loss of balance, a visual change and weakness. The patient's pertinent negatives include no memory loss, near-syncope or slurred speech. This is a chronic problem. The current episode started more than 1 year ago. The neurological problem developed insidiously. The problem has been gradually worsening since onset. There was no focality noted. Associated symptoms include back pain, bladder incontinence, confusion, dizziness and fatigue. Pertinent negatives include no abdominal pain, auditory change, aura, bowel incontinence, chest pain, diaphoresis, fever, headaches, light-headedness, nausea, neck pain, palpitations, shortness of breath or vomiting. Past treatments include walking, medication and acetaminophen. The treatment provided moderate relief. His past medical history is significant for mood changes. There is no history of a bleeding disorder, a clotting disorder, a CVA, dementia, head trauma, liver disease or seizures. Back Pain   This is a chronic problem. The current episode started more than 1 year ago. The problem occurs constantly. The problem is unchanged. The pain is present in the lumbar spine, sacro-iliac and thoracic spine. The quality of the pain is described as aching. The pain is at a severity of 7/10. The symptoms are aggravated by lying down, position and coughing. Stiffness is present in the morning. Associated symptoms include bladder incontinence, leg pain, numbness, paresis, paresthesias, tingling and weakness.  Pertinent negatives include no abdominal pain, bowel incontinence, chest pain, fever, headaches, pelvic pain, perianal numbness or weight loss. Risk factors include history of steroid use, lack of exercise and sedentary lifestyle. He has tried NSAIDs, walking, ice, muscle relaxant, chiropractic manipulation, heat, home exercises, analgesics and bed rest for the symptoms. The treatment provided moderate relief. Leg Pain    The incident occurred more than 1 week ago. There was no injury mechanism. The quality of the pain is described as aching. The pain is at a severity of 5/10. The pain is mild. The pain has been improving since onset. Associated symptoms include muscle weakness, numbness and tingling. He reports no foreign bodies present. The symptoms are aggravated by movement, palpation and weight bearing. He has tried rest, heat, elevation, acetaminophen, immobilization, ice, non-weight bearing and NSAIDs for the symptoms. The treatment provided moderate relief. Past Medical History:   Diagnosis Date    Chronic bilateral low back pain with sciatica 12/10/2019    Mild L4-5 degenerative disc disease and minimal facet degenerative  changes at L5-S1.  Otherwise normal lumbosacral spine. Mild remote benign lower thoracic compression fractures and mild to  moderate thoracic degenerative disc disease.  Erectile dysfunction     Multiple sclerosis ([facility] [address] 12/10/2019    Sees [facility] Date of onset: August 2006-Left optic neuritis Date of diagnosis of MS: September 2006-Diagnosed by Dr. Elmer Little course at onset: Relapsing-Remitting Current disease course: Relapsing-Remitting Previous disease therapies:  IVMP August 2006 Copaxone-September 2006-January 2007 (stopped due to expense) IVMP July 2008 (optic neuritis on right) IVMP November 6-8, 2015 (Left hand weaknes    OAB (overactive bladder) 6/15/2018    Spasticity 9/15/2015       History reviewed. No pertinent surgical history.     Social History     Socioeconomic History    Marital status: Single     Spouse name: None    Number of children: None    Years of education: 15    Highest education level: Some college, no degree   Occupational History    Occupation: disabled dt MS   Social Needs    Financial resource strain: Very hard    Food insecurity     Worry: Sometimes true     Inability: Sometimes true    Transportation needs     Medical: Yes     Non-medical: Yes   Tobacco Use    Smoking status: Current Every Day Smoker     Packs/day: 1.00    Smokeless tobacco: Never Used   Substance and Sexual Activity    Alcohol use: Not Currently    Drug use: Never    Sexual activity: None   Lifestyle    Physical activity     Days per week: 3 days     Minutes per session: 10 min    Stress: Rather much   Relationships    Social connections     Talks on phone: Twice a week     Gets together: Twice a week     Attends [employer] service: Never     Active member of club or organization: No     Attends meetings of clubs or organizations: Never     Relationship status: Never     Intimate partner violence     Fear of current or ex partner: No     Emotionally abused: No     Physically abused: No     Forced sexual activity: No   Other Topics Concern    None   Social History Narrative    Social/Functional History     on SSDI for MS    Lives in [place] with his twin brother and long term friend-Madan. He still pays most the bills. He was taken advantage of by the situation. He grew up in [place] but is no longer Episcopal and is agnostic if not in [place].    Still able to drive-but can't afford a car. 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 History reviewed. No pertinent family history.     Allergies   Allergen Reactions    Prednisone Other (See Comments)     Cant have dt [place] [employer] study he is in with [facility]       Review of Systems Constitutional: Positive for fatigue. Negative for diaphoresis, fever and weight loss. Respiratory: Negative for shortness of breath. Cardiovascular: Negative for chest pain, palpitations and near-syncope. Gastrointestinal: Negative for abdominal pain, bowel incontinence, nausea and vomiting. Genitourinary: Positive for bladder incontinence. Negative for pelvic pain. Musculoskeletal: Positive for back pain. Negative for neck pain. Neurological: Positive for dizziness, tingling, focal weakness, weakness, numbness, paresthesias and loss of balance. Negative for light-headedness and headaches. Psychiatric/Behavioral: Positive for confusion. Negative for memory loss. Objective    There were no vitals filed for this visit. No data recorded            PHYSICAL EXAMINATION:  [ INSTRUCTIONS:  \"[x]\" Indicates a positive item  \"[]\" Indicates a negative item  -- DELETE ALL ITEMS NOT EXAMINED]    [x] Alert  [x] Oriented to person/place/time      [x] No apparent distress  [x] Not toxic appearing    [x] Face complexion normal appearing [x] Sclera clear  [x] Lips are not cyanotic      [x] Breathing appears normal  [] Appears tachypneic      [] Rash on visible skin    [x] Cranial Nerves II-XII grossly intact    [x] Motor grossly intact in visible upper extremities, including stiff but intact range of motion in cervical, upper extremity and thoracic  [x] Motor grossly intact in visible lower extremities, including normal range of motion in the hips and knees for stiffness in the lumbar spine    [x] Normal Mood  [x] Not anxious appearing    [x] Not depressed appearing  [x] Not confused appearing      [x]  Good short term memory  [x]  Good long term memory    [x]  Able to follow 2-3 step commands    Due to this being a TeleHealth encounter, evaluation of the following organ systems is limited: Vitals/Constitutional/EENT/Resp/CV/GI//MS/Neuro/Skin/Heme-Lymph-Imm.     ASSESSMENT/PLAN:     After a thorough review and discussion of the previous medical records, patient comprehensive medical, surgical, and family and social history, Review of Systems, their OARRS, their Screener and Opioid Assessment for Patients with Pain (SOAPP®-R), recent diagnostics, and symptomatic results to previous treatment, it is my impression that the patients is suffering with progressive and severe:     Diagnosis Orders   1. Neuropathic pain of both legs  MRI LUMBAR SPINE W WO CONTRAST   2. Chronic bilateral thoracic back pain  oxyCODONE-acetaminophen (PERCOCET) 7.5-325 MG per tablet   3. Chronic pain of both shoulders  oxyCODONE-acetaminophen (PERCOCET) 7.5-325 MG per tablet   4. Neurogenic bladder     5. Chronic bilateral low back pain with bilateral sciatica  oxyCODONE-acetaminophen (PERCOCET) 7.5-325 MG per tablet    MRI LUMBAR SPINE W WO CONTRAST   6. Smoker     7. Raynaud's disease without gangrene     8. Vitamin D deficiency     9. Multiple sclerosis (HCC)  oxyCODONE-acetaminophen (PERCOCET) 7.5-325 MG per tablet    gabapentin (NEURONTIN) 100 MG capsule   10. Current moderate episode of major depressive disorder, unspecified whether recurrent ([facility] [address]  oxyCODONE-acetaminophen (PERCOCET) 7.5-325 MG per tablet   11. High risk medication use  oxyCODONE-acetaminophen (PERCOCET) 7.5-325 MG per tablet    gabapentin (NEURONTIN) 100 MG capsule    methocarbamol (ROBAXIN) 500 MG tablet   12. Lumbosacral radiculopathy at L5  MRI LUMBAR SPINE W WO CONTRAST    gabapentin (NEURONTIN) 100 MG capsule    methocarbamol (ROBAXIN) 500 MG tablet   13.  Leg pain, left  MRI LUMBAR SPINE W WO CONTRAST    gabapentin (NEURONTIN) 100 MG capsule    methocarbamol (ROBAXIN) 500 MG tablet       I am also concerned by lifestyle and mood issues including:    Past Medical History:   Diagnosis Date    Chronic bilateral low back pain with sciatica 12/10/2019    Mild L4-5 degenerative disc disease and minimal facet degenerative  changes at L5-S1.  Otherwise normal lumbosacral spine. Mild remote benign lower thoracic compression fractures and mild to  moderate thoracic degenerative disc disease.  Erectile dysfunction     Multiple sclerosis ([facility] [address] 12/10/2019    Sees [facility] Date of onset: August 2006-Left optic neuritis Date of diagnosis of MS: September 2006-Diagnosed by Dr. Stella Trejo course at onset: Relapsing-Remitting Current disease course: Relapsing-Remitting Previous disease therapies:  IVMP August 2006 Copaxone-September 2006-January 2007 (stopped due to expense) IVMP July 2008 (optic neuritis on right) IVMP November 6-8, 2015 (Left hand weaknes    OAB (overactive bladder) 6/15/2018    Spasticity 9/15/2015           Given their medication, chronic pain and lifestyle and medications they are at risk for :    Falls, constipation, addiction  Loss of livelyhood due to severe pain, debility, weight gain and  vitamin D deficiency    The patient was educated regarding proper diet, fitness routine, and regulatory restrictions concerning pain medications. Previous notes, comprehensive past medical, surgical, family history, and diagnostics were reviewed. Patient education and councelling were provided regarding off label use,treatment options and medication and injection risks. Current and old OARRS ([place] Automated Prescription Reporting System) records reviewed, all refills reviewed since last visit,  Behavioral agreement/RALPH regulations   and Toxicology screen was reviewed with patient and is up to date. There are no current red flags. They are making good progress regarding pain relief, they are performing at a functional level regarding activities of daily living, family interactions and psychological functioning, they're not having any adverse effects or side effects from the current medications, and I see no findings of aberrant drug taking or addiction related behaviors.   The patient is aware that they have a chronic pain condition and they may require opiates dosing for life. All efforts will be made to wean to the lowest effective dose. Other therapies for pain have not been effective including nonopiate medications. Injections and exercises are only partially effective. A Rx for Narcan was offered to help prevent accidental overdose. RX Monitoring 5/27/2020   Periodic Controlled Substance Monitoring Possible medication side effects, risk of tolerance/dependence & alternative treatments discussed. ;No signs of potential drug abuse or diversion identified. ;Assessed functional status. ;Obtaining appropriate analgesic effect of treatment. Chronic Pain > 50 MEDD Re-evaluated the status of the patient's underlying condition causing pain. ;Considered consultation with a specialist.;Obtained or confirmed \"Consent for Opioid Use\" on file. Patient is currently taking:       I have discontinued Marko Mendenhall's baclofen and topiramate. I have also changed his oxyCODONE-acetaminophen. Additionally, I am having him start on gabapentin and methocarbamol. Lastly, I am having him maintain his vitamin D, natalizumab, and varenicline. I also recommend the following Medications:    Orders Placed This Encounter   Medications    oxyCODONE-acetaminophen (PERCOCET) 7.5-325 MG per tablet     Sig: Take 1 tablet by mouth every 8 hours as needed for Pain (Max 90 per month) for up to 30 days. Intended supply: 30 days     Dispense:  90 tablet     Refill:  0     Reduce doses taken as pain becomes manageable    gabapentin (NEURONTIN) 100 MG capsule     Sig: Take 1 capsule by mouth 3 times daily as needed (pain) for up to 30 days. Dispense:  90 capsule     Refill:  2    methocarbamol (ROBAXIN) 500 MG tablet     Sig: Take 1 tablet by mouth 2 times daily as needed (spasms) Generic equivalent acceptable, unless otherwise noted. Dispense:  60 tablet     Refill:  1        -which helps with pain and function.     Otherwise, continue the current AST, ALT  No results found for: WBC, RBC, HGB, HCT, MCV, MCH, MCHC, RDW, PLT, MPV    Lab Results   Component Value Date    DSCOMMENT See Note 12/10/2019       Lab Results   Component Value Date    BUPREN Negative 12/10/2019       No results found for: METPHEN, PHENTERMINE, BENZOYL, ALPRAZ, ALPHAOHALPRA, CLONAZEPAM, 7AMINOCLONAZ, DIAZEP, SAWYER, OXAZ, TEMAZ, LORAZEPAM, MIDAZOLAM, ZOLPIDEM, GRAHAM, ETG, MARIJMET, PCP, PAINMGTDRUGP, EERPAINMGTPA, LABCREA      , I discussed results with patient. See follow-up plans for new studies. Therapies:  HEP-gentle stretching and relaxation techniques-demonstrated with patient-they are to do them twice a day. They are also advised to make the following lifestyle changes:  Goals      SOAPP-R      12/10/19 score: 17- moderate risk  01/02/2020 score: 16 - moderate risk   1/22/20 score: 15- moderarte risk  2/28/20 score: 14- moderate risk    5- score - 12 - moderate risk             Injections or Epidurals:  Injection options were discussed. After a complete review of the medical record,OARRS, a comprehensive physical exam, and discussion with the patient I have determined that the patient would benefit from a series of 2 caudal epidural steroid injections using a C-arm and contrast as appropriate. The risks and benefits were thoroughly explained to the patient orally and in writing. Pt gave verbal and writen consent. The patient was given the option of taking PO Valium 5-10 mg prior to the procedure. They were told that if they chose to take the Valium they should not drive while under it's affects. The patient is to call us as needed and the day following each procedure to report any concerns or problems. They will follow up with me approximately one month after the last block to evaluate the success of the injections and discuss need for further treatment.     I am hoping to improve their low back pain by 60-80% for at least 6 months, improve their overall function, and minimize their reliance on oral medications. Trigger point injections seem to have helped continue as needed patient gave verbal consent to ordered injections. See follow-up plans for planned injections. Supplements:  Vitamin D with increased dosing during the rainy months,   Education was given on:   Dietary and Fitness--daily stretches and low carb diet-in chair Yoga when possible             Follow up with Primary Care Physician regarding their general medical needs. Stressed the importance of following up with PCP and specialists for his/her chronic diseases, health, CV, and cancer screening and continued care. Will follow disease activity/progression and adjust therapeutic regimen to disease activity and severity. Discussed medication dosage, usage, goals of therapy, and side effects. Available test results were reviewed -Discussed findings, impression and plan with patient. An additional 20 minutes were spent outside of the patient visit to review records. Additional time spent with the patient to discuss their questions. Additional time spent with the patient devoted to discussing treatment strategy, planning, and implementation. Patient understands above plan; questions asked and answered. Patient agrees to plan as noted above. F/U in 2-3months  At least 50% of the visit was involved in the discussion of the options for treatment. We discussed exercises, medication, interventional therapies and surgery. Healthy life style is essential with patient hard work to achieve the wellness. In addition; discussion with the patient and/or family about any of the diagnostic results, impressions and/or recommended diagnostic studies, prognosis, risks and benefits of treatment options, instructions for treatment and/or follow-up, importance of compliance with chosen treatment options, risk-factor reduction, and patient/family education.         They are to follow up in 2 months to review medication, efficacy of injections, pill counts, OARRS check, SOAPPR assessment, review diagnostics, to review previous and future treatment plans and assess appropriateness for continued therapy. New Diagnostics  Orders Placed This Encounter   Procedures    MRI LUMBAR SPINE W WO CONTRAST       Follow-up in 2-1/2 to 3 months for guarding the efficacy of current plan and future treatment. An  electronic signature was used to authenticate this note. -Dr Joan Saunders, DO       With MA assistance from:   Carla López MA     19}    Pursuant to the emergency declaration under the [facility], [facility] waiver authority and the [employer] Act, this Virtual  Visit was conducted, with patient's consent, to reduce the patient's risk of exposure to COVID-19 and provide continuity of care for an established patient. Services were provided through a video synchronous discussion virtually to substitute for in-person clinic visit.

## 2020-09-17 RX ORDER — OXYCODONE AND ACETAMINOPHEN 7.5; 325 MG/1; MG/1
1 TABLET ORAL EVERY 8 HOURS PRN
Qty: 90 TABLET | Refills: 0 | Status: SHIPPED | OUTPATIENT
Start: 2020-09-29 | End: 2020-09-18 | Stop reason: SDUPTHER

## 2020-09-18 RX ORDER — OXYCODONE AND ACETAMINOPHEN 7.5; 325 MG/1; MG/1
1 TABLET ORAL EVERY 8 HOURS PRN
Qty: 90 TABLET | Refills: 0 | Status: SHIPPED | OUTPATIENT
Start: 2020-09-18 | End: 2020-10-16 | Stop reason: SDUPTHER

## 2020-09-18 RX ORDER — OXYCODONE AND ACETAMINOPHEN 7.5; 325 MG/1; MG/1
1 TABLET ORAL EVERY 8 HOURS PRN
Qty: 90 TABLET | Refills: 0 | Status: SHIPPED | OUTPATIENT
Start: 2020-09-29 | End: 2020-09-18 | Stop reason: SDUPTHER

## 2020-10-16 RX ORDER — OXYCODONE AND ACETAMINOPHEN 7.5; 325 MG/1; MG/1
1 TABLET ORAL EVERY 8 HOURS PRN
Qty: 90 TABLET | Refills: 0 | Status: SHIPPED | OUTPATIENT
Start: 2020-10-17 | End: 2020-11-12 | Stop reason: SDUPTHER

## 2020-10-22 ENCOUNTER — VIRTUAL VISIT (OUTPATIENT)
Dept: PHYSICAL MEDICINE AND REHAB | Age: 35
End: 2020-10-22
Payer: MEDICARE

## 2020-10-22 PROCEDURE — G8484 FLU IMMUNIZE NO ADMIN: HCPCS | Performed by: PHYSICAL MEDICINE & REHABILITATION

## 2020-10-22 PROCEDURE — G8427 DOCREV CUR MEDS BY ELIG CLIN: HCPCS | Performed by: PHYSICAL MEDICINE & REHABILITATION

## 2020-10-22 PROCEDURE — 99214 OFFICE O/P EST MOD 30 MIN: CPT | Performed by: PHYSICAL MEDICINE & REHABILITATION

## 2020-10-22 PROCEDURE — 4004F PT TOBACCO SCREEN RCVD TLK: CPT | Performed by: PHYSICAL MEDICINE & REHABILITATION

## 2020-10-22 PROCEDURE — G8420 CALC BMI NORM PARAMETERS: HCPCS | Performed by: PHYSICAL MEDICINE & REHABILITATION

## 2020-10-22 ASSESSMENT — ENCOUNTER SYMPTOMS
BACK PAIN: 1
VISUAL CHANGE: 1
SHORTNESS OF BREATH: 0
ABDOMINAL PAIN: 0
BOWEL INCONTINENCE: 0
NAUSEA: 0
VOMITING: 0

## 2020-10-22 NOTE — PROGRESS NOTES
TELEHEALTH EVALUATION -- Audio/Visual (During VJFXS-29 public health emergency)    Due to COVID 19 outbreak, patient's office visit was converted to a virtual visit. Patient was contacted and agreed to proceed with a virtual visit via  [email or web address]   The risks and benefits of converting to a virtual visit were discussed in light of the current infectious disease epidemic. Patient also understood that insurance coverage and co-pays are up to their individual insurance plans. Subjective       This patient has requested an audio/video evaluation for the following concern(s):    HPI:    Patient's pain level is 3 out of 10 with the medication and its 9-10 out of 10 without it. We recently trialed gabapentin again however he felt that that the gabapentin gabapentin was worsening his urinary incontinence while sleeping therefore went off of it. He does already have baseline urinary dysfunction from his multiple sclerosis. Recent EMG confirmed demyelination and axonal findings consistent with multiple sclerosis. MRI of the spine was not completed patient states that he has an appoint with Dr. Antonina Benitez with spine surgery on 10/27. He is doing well with his medications but does not need a refill today. 71-year-old male here again to follow-up for pain due to severe progressive MS unfortunately has degenerative joint and back pain in the mid and low back as well as neuropathic pain in his hands and feet. I have okayed him to transition to 3 a day of the Percocet and that will be his maximal dose he is tried baclofen without help Topamax without help injections without help he is tried Neurontin and it makes him feel little loopy we discussed taking possible little lower dose only at night.   He recently had EMG studies which showed the multiple sclerosis and significant slowing at the carpal tunnel consistent with carpal tunnel syndrome and or the MS and low amplitudes in the lower extremity consistent with the MS and L5-S1 radiculopathy possibly overlying the MS. Back Pain   This is a chronic problem. The current episode started more than 1 year ago. The problem occurs constantly. The problem is unchanged. The pain is present in the lumbar spine, sacro-iliac and thoracic spine. The quality of the pain is described as aching. The pain is at a severity of 7/10. The symptoms are aggravated by lying down, position and coughing. Stiffness is present in the morning. Associated symptoms include bladder incontinence, leg pain, numbness, paresis, paresthesias, tingling and weakness. Pertinent negatives include no abdominal pain, bowel incontinence, chest pain, fever, headaches, pelvic pain, perianal numbness or weight loss. Risk factors include history of steroid use, lack of exercise and sedentary lifestyle. He has tried NSAIDs, walking, ice, muscle relaxant, chiropractic manipulation, heat, home exercises, analgesics and bed rest for the symptoms. The treatment provided moderate relief. Leg Pain    The incident occurred more than 1 week ago. There was no injury mechanism. The quality of the pain is described as aching. The pain is at a severity of 5/10. The pain is mild. The pain has been improving since onset. Associated symptoms include muscle weakness, numbness and tingling. He reports no foreign bodies present. The symptoms are aggravated by movement, palpation and weight bearing. He has tried rest, heat, elevation, acetaminophen, immobilization, ice, non-weight bearing and NSAIDs for the symptoms. The treatment provided moderate relief. Neurologic Problem   The patient's primary symptoms include an altered mental status, clumsiness, focal sensory loss, focal weakness, a loss of balance, a visual change and weakness. The patient's pertinent negatives include no memory loss, near-syncope or slurred speech. This is a chronic problem. The current episode started more than 1 year ago.  The neurological problem developed insidiously. The problem has been gradually worsening since onset. There was no focality noted. Associated symptoms include back pain, bladder incontinence, confusion, dizziness and fatigue. Pertinent negatives include no abdominal pain, auditory change, aura, bowel incontinence, chest pain, diaphoresis, fever, headaches, light-headedness, nausea, neck pain, palpitations, shortness of breath or vomiting. Past treatments include walking, medication and acetaminophen. The treatment provided moderate relief. His past medical history is significant for mood changes. There is no history of a bleeding disorder, a clotting disorder, a CVA, dementia, head trauma, liver disease or seizures. Past Medical History:   Diagnosis Date    Chronic bilateral low back pain with sciatica 12/10/2019    Mild L4-5 degenerative disc disease and minimal facet degenerative  changes at L5-S1.  Otherwise normal lumbosacral spine. Mild remote benign lower thoracic compression fractures and mild to  moderate thoracic degenerative disc disease.  Erectile dysfunction     Multiple sclerosis ([facility] [address] 12/10/2019    Sees [facility] Date of onset: August 2006-Left optic neuritis Date of diagnosis of MS: September 2006-Diagnosed by Dr. Flavio Garcia course at onset: Relapsing-Remitting Current disease course: Relapsing-Remitting Previous disease therapies:  IVMP August 2006 Copaxone-September 2006-January 2007 (stopped due to expense) IVMP July 2008 (optic neuritis on right) IVMP November 6-8, 2015 (Left hand weaknes    OAB (overactive bladder) 6/15/2018    Spasticity 9/15/2015       History reviewed. No pertinent surgical history.     Social History     Socioeconomic History    Marital status: Single     Spouse name: None    Number of children: None    Years of education: 15    Highest education level: Some college, no degree   Occupational History    Occupation: disabled dt MS   Social Needs    Financial resource strain: Very hard    Food insecurity     Worry: Sometimes true     Inability: Sometimes true    Transportation needs     Medical: Yes     Non-medical: Yes   Tobacco Use    Smoking status: Current Every Day Smoker     Packs/day: 1.00    Smokeless tobacco: Never Used   Substance and Sexual Activity    Alcohol use: Not Currently    Drug use: Never    Sexual activity: None   Lifestyle    Physical activity     Days per week: 3 days     Minutes per session: 10 min    Stress: Rather much   Relationships    Social connections     Talks on phone: Twice a week     Gets together: Twice a week     Attends [employer] service: Never     Active member of club or organization: No     Attends meetings of clubs or organizations: Never     Relationship status: Never     Intimate partner violence     Fear of current or ex partner: No     Emotionally abused: No     Physically abused: No     Forced sexual activity: No   Other Topics Concern    None   Social History Narrative    Social/Functional History     on SSDI for MS    Lives in [place] with his twin brother and long term friend-Madan. He still pays most the bills. He was taken advantage of by the situation. He grew up in [employer] but is no longer [employer]ism and is agnostic if not in [place].    Still able to drive-but can't afford a car. 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 History reviewed. No pertinent family history. Allergies   Allergen Reactions    Prednisone Other (See Comments)     Cant have hector Lopez study he is in with [facility]       Review of Systems   Constitutional: Positive for fatigue. Negative for diaphoresis, fever and weight loss. Respiratory: Negative for shortness of breath. Cardiovascular: Negative for chest pain, palpitations and near-syncope.    Gastrointestinal: Negative for abdominal pain, bowel incontinence, nausea and vomiting. Endocrine: Positive for heat intolerance. Genitourinary: Positive for bladder incontinence. Negative for pelvic pain. Musculoskeletal: Positive for back pain. Negative for neck pain. Allergic/Immunologic: Positive for immunocompromised state. Neurological: Positive for dizziness, tingling, focal weakness, weakness, numbness, paresthesias and loss of balance. Negative for light-headedness and headaches. Psychiatric/Behavioral: Positive for confusion and dysphoric mood. Negative for memory loss. Objective    There were no vitals filed for this visit. No data recorded            PHYSICAL EXAMINATION:  [ INSTRUCTIONS:  \"[x]\" Indicates a positive item  \"[]\" Indicates a negative item  -- DELETE ALL ITEMS NOT EXAMINED]    [x] Alert  [x] Oriented to person/place/time      [x] No apparent distress  [x] Not toxic appearing    [x] Face complexion normal appearing [x] Sclera clear  [x] Lips are not cyanotic      [x] Breathing appears normal  [] Appears tachypneic      [] Rash on visible skin    [x] Cranial Nerves II-XII grossly intact    [x] Motor grossly intact in visible upper extremities, including stiff but intact range of motion in cervical, upper extremity and thoracic  [x] Motor grossly intact in visible lower extremities, including normal range of motion in the hips and knees for stiffness in the lumbar spine    [x] Normal Mood  [x] Not anxious appearing    [x] Not depressed appearing  [x] Not confused appearing      [x]  Good short term memory  [x]  Good long term memory    [x]  Able to follow 2-3 step commands    Due to this being a TeleHealth encounter, evaluation of the following organ systems is limited: Vitals/Constitutional/EENT/Resp/CV/GI//MS/Neuro/Skin/Heme-Lymph-Imm.     ASSESSMENT/PLAN:     After a thorough review and discussion of the previous medical records, patient comprehensive medical, surgical, and family and social history, Review of Systems, their OARRS, their Screener and Opioid Assessment for Patients with Pain (SOAPP®-R), recent diagnostics, and symptomatic results to previous treatment, it is my impression that the patients is suffering with progressive and severe:     Diagnosis Orders   1. Chronic pain of both shoulders     2. Vitamin D deficiency     3. Current severe episode of major depressive disorder without psychotic features, unspecified whether recurrent ([address]     4. Multiple sclerosis ([address]     5. Chronic bilateral low back pain with sciatica, sciatica laterality unspecified     6. High risk medication use     7. Chronic bilateral thoracic back pain     8. Spasticity     9. OAB (overactive bladder)         I am also concerned by lifestyle and mood issues including:    Past Medical History:   Diagnosis Date    Chronic bilateral low back pain with sciatica 12/10/2019    Mild L4-5 degenerative disc disease and minimal facet degenerative  changes at L5-S1.  Otherwise normal lumbosacral spine. Mild remote benign lower thoracic compression fractures and mild to  moderate thoracic degenerative disc disease.     Erectile dysfunction     Multiple sclerosis ([facility] [address] 12/10/2019    Sees [facility] Date of onset: August 2006-Left optic neuritis Date of diagnosis of MS: September 2006-Diagnosed by Dr. Leslie Aponte course at onset: Relapsing-Remitting Current disease course: Relapsing-Remitting Previous disease therapies:  IVMP August 2006 Copaxone-September 2006-January 2007 (stopped due to expense) IVMP July 2008 (optic neuritis on right) IVMP November 6-8, 2015 (Left hand weaknes    OAB (overactive bladder) 6/15/2018    Spasticity 9/15/2015           Given their medication, chronic pain and lifestyle and medications they are at risk for :    Falls, constipation, addiction  Loss of livelyhood due to severe pain, debility, weight gain and  vitamin D deficiency    The patient was educated regarding proper diet, fitness routine, and regulatory restrictions concerning pain medications. Previous notes, comprehensive past medical, surgical, family history, and diagnostics were reviewed. Patient education and councelling were provided regarding off label use,treatment options and medication and injection risks. Overall greater than 25 minutes spent in direct and indirect patient care. Current and old OARRS ([place] Automated Prescription Reporting System) records reviewed, all refills reviewed since last visit,  Behavioral agreement/RALPH regulations   and Toxicology screen was reviewed with patient and is up to date. There are no current red flags. They are making good progress regarding pain relief, they are performing at a functional level regarding activities of daily living, family interactions and psychological functioning, they're not having any adverse effects or side effects from the current medications, and I see no findings of aberrant drug taking or addiction related behaviors. The patient is aware that they have a chronic pain condition and they may require opiates dosing for life. All efforts will be made to wean to the lowest effective dose. Other therapies for pain have not been effective including nonopiate medications. Injections and exercises are only partially effective. A Rx for Narcan was offered to help prevent accidental overdose. RX Monitoring 5/27/2020   Periodic Controlled Substance Monitoring Possible medication side effects, risk of tolerance/dependence & alternative treatments discussed. ;No signs of potential drug abuse or diversion identified. ;Assessed functional status. ;Obtaining appropriate analgesic effect of treatment. Chronic Pain > 50 MEDD Re-evaluated the status of the patient's underlying condition causing pain. ;Considered consultation with a specialist.;Obtained or confirmed \"Consent for Opioid Use\" on file. Patient is currently taking:       I am having [facility]. Tasha Galan maintain his vitamin D, natalizumab, varenicline, gabapentin, and oxyCODONE-acetaminophen. I also recommend the following Medications:    No orders of the defined types were placed in this encounter.       -which helps with pain and function. Otherwise, continue the current pain medications that I have prescibed. Radiologic:   Old films reviewed,  MRI of L spinejimbo   I discussed results with patients. see Follow up plans below  For any new studies. Care Everywhere Updates:  requested and reviewed. No new issues noted. Pending with Dr Belia [facility] spine surg. Electrodiagnostic:  Previous studies requested,  CTS and MS findings as above   I discussed results with patient. See follow-up plans for new studies. Labs:  Previous labs reviewed     No results found for: NA, K, CL, CO2, BUN, CREATININE, CALCIUM, LABALBU, BILITOT, ALKPHOS, AST, ALT  No results found for: WBC, RBC, HGB, HCT, MCV, MCH, MCHC, RDW, PLT, MPV    Lab Results   Component Value Date    DSCOMMENT See Note 12/10/2019       Lab Results   Component Value Date    BUPREN Negative 12/10/2019       No results found for: METPHEN, PHENTERMINE, BENZOYL, ALPRAZ, ALPHAOHALPRA, CLONAZEPAM, 7AMINOCLONAZ, DIAZEP, SAWYER, OXAZ, TEMAZ, LORAZEPAM, MIDAZOLAM, ZOLPIDEM, GRAHAM, ETG, MARIJMET, PCP, PAINMGTDRUGP, EERPAINMGTPA, LABCREA      , I discussed results with patient. See follow-up plans for new studies. Therapies:  HEP-gentle stretching and relaxation techniques-demonstrated with patient-they are to do them twice a day. They are also advised to make the following lifestyle changes:  Goals      SOAPP-R      12/10/19 score: 17- moderate risk  01/02/2020 score: 16 - moderate risk   1/22/20 score: 15- moderarte risk  2/28/20 score: 14- moderate risk    5- score - 12 - moderate risk             Injections or Epidurals:  Injection options were discussed.   Monthly trigger point injections add vitamin B12 when able. Patient gave verbal consent to ordered injections. Call as needed. See follow-up plans for planned injections. Supplements:  Vitamin D with increased dosing during the rainy months, add co-Q10 for heart health. Education was given on:   Dietary and Fitness--daily stretches and low carb diet-in chair Yoga when possible             Follow up with Primary Care Physician regarding their general medical needs. Stressed the importance of following up with PCP and specialists for his/her chronic diseases, health, CV, and cancer screening and continued care. Will follow disease activity/progression and adjust therapeutic regimen to disease activity and severity. Discussed medication dosage, usage, goals of therapy, and side effects. Available test results were reviewed -Discussed findings, impression and plan with patient. An additional 10 minutes were spent outside of the patient visit to review records. Additional time spent with the patient to discuss their questions. Additional time spent with the patient devoted to discussing treatment strategy, planning, and implementation generalized musculoskeletal health plan. Patient understands above plan; questions asked and answered. Patient agrees to plan as noted above. F/U in 2-3months  At least 50% of the visit was involved in the discussion of the options for treatment. We discussed exercises, medication, interventional therapies and surgery. Healthy life style is essential with patient hard work to achieve the wellness. In addition; discussion with the patient and/or family about any of the diagnostic results, impressions and/or recommended diagnostic studies, prognosis, risks and benefits of treatment options, instructions for treatment and/or follow-up, importance of compliance with chosen treatment options, risk-factor reduction, and patient/family education.         They are to follow up in 2 months to review medication, efficacy of injections, pill counts, OARRS check, SOAPPR assessment, review diagnostics, to review previous and future treatment plans and assess appropriateness for continued therapy. New Diagnostics  No orders of the defined types were placed in this encounter. Follow-up in 2-1/2 to 3 months for guarding the efficacy of current plan and future treatment. An  electronic signature was used to authenticate this note. -Dr Milton Mendoza, DO       With MA assistance from:   Ranjit Corral MA     19}    Pursuant to the emergency declaration under the [facility] waiver authority and the [employer] and [employer] Act, this Virtual  Visit was conducted, with patient's consent, to reduce the patient's risk of exposure to COVID-19 and provide continuity of care for an established patient. Services were provided through a video synchronous discussion virtually to substitute for in-person clinic visit.

## 2020-11-12 RX ORDER — OXYCODONE AND ACETAMINOPHEN 7.5; 325 MG/1; MG/1
1 TABLET ORAL EVERY 8 HOURS PRN
Qty: 90 TABLET | Refills: 0 | Status: SHIPPED | OUTPATIENT
Start: 2020-11-15 | End: 2020-12-14 | Stop reason: SDUPTHER

## 2020-12-14 RX ORDER — VARENICLINE TARTRATE 25 MG
KIT ORAL
Qty: 1 BOX | Refills: 0 | Status: SHIPPED | OUTPATIENT
Start: 2020-12-14 | End: 2021-07-22

## 2020-12-14 RX ORDER — OXYCODONE AND ACETAMINOPHEN 7.5; 325 MG/1; MG/1
1 TABLET ORAL EVERY 8 HOURS PRN
Qty: 90 TABLET | Refills: 0 | Status: SHIPPED | OUTPATIENT
Start: 2020-12-14 | End: 2021-01-11 | Stop reason: SDUPTHER

## 2021-01-11 DIAGNOSIS — M25.511 CHRONIC PAIN OF BOTH SHOULDERS: ICD-10-CM

## 2021-01-11 DIAGNOSIS — G35 MULTIPLE SCLEROSIS (HCC): ICD-10-CM

## 2021-01-11 DIAGNOSIS — F32.1 CURRENT MODERATE EPISODE OF MAJOR DEPRESSIVE DISORDER, UNSPECIFIED WHETHER RECURRENT (HCC): ICD-10-CM

## 2021-01-11 DIAGNOSIS — G89.29 CHRONIC BILATERAL LOW BACK PAIN WITH BILATERAL SCIATICA: ICD-10-CM

## 2021-01-11 DIAGNOSIS — M54.41 CHRONIC BILATERAL LOW BACK PAIN WITH BILATERAL SCIATICA: ICD-10-CM

## 2021-01-11 DIAGNOSIS — Z79.899 HIGH RISK MEDICATION USE: ICD-10-CM

## 2021-01-11 DIAGNOSIS — G89.29 CHRONIC BILATERAL THORACIC BACK PAIN: ICD-10-CM

## 2021-01-11 DIAGNOSIS — M54.6 CHRONIC BILATERAL THORACIC BACK PAIN: ICD-10-CM

## 2021-01-11 DIAGNOSIS — M54.42 CHRONIC BILATERAL LOW BACK PAIN WITH BILATERAL SCIATICA: ICD-10-CM

## 2021-01-11 DIAGNOSIS — G89.29 CHRONIC PAIN OF BOTH SHOULDERS: ICD-10-CM

## 2021-01-11 DIAGNOSIS — M25.512 CHRONIC PAIN OF BOTH SHOULDERS: ICD-10-CM

## 2021-01-11 RX ORDER — OXYCODONE AND ACETAMINOPHEN 7.5; 325 MG/1; MG/1
1 TABLET ORAL EVERY 8 HOURS PRN
Qty: 90 TABLET | Refills: 0 | Status: SHIPPED | OUTPATIENT
Start: 2021-01-12 | End: 2021-02-11 | Stop reason: SDUPTHER

## 2021-01-19 ENCOUNTER — VIRTUAL VISIT (OUTPATIENT)
Dept: PHYSICAL MEDICINE AND REHAB | Age: 36
End: 2021-01-19
Payer: MEDICARE

## 2021-01-19 DIAGNOSIS — G57.93 NEUROPATHIC PAIN OF BOTH LEGS: ICD-10-CM

## 2021-01-19 DIAGNOSIS — E55.9 VITAMIN D DEFICIENCY: Primary | ICD-10-CM

## 2021-01-19 DIAGNOSIS — N32.81 OAB (OVERACTIVE BLADDER): ICD-10-CM

## 2021-01-19 DIAGNOSIS — Z79.899 HIGH RISK MEDICATION USE: ICD-10-CM

## 2021-01-19 DIAGNOSIS — N31.9 NEUROGENIC BLADDER: ICD-10-CM

## 2021-01-19 DIAGNOSIS — I73.00 RAYNAUD'S DISEASE WITHOUT GANGRENE: ICD-10-CM

## 2021-01-19 PROCEDURE — G8484 FLU IMMUNIZE NO ADMIN: HCPCS | Performed by: PHYSICAL MEDICINE & REHABILITATION

## 2021-01-19 PROCEDURE — G8427 DOCREV CUR MEDS BY ELIG CLIN: HCPCS | Performed by: PHYSICAL MEDICINE & REHABILITATION

## 2021-01-19 PROCEDURE — 99214 OFFICE O/P EST MOD 30 MIN: CPT | Performed by: PHYSICAL MEDICINE & REHABILITATION

## 2021-01-19 PROCEDURE — G8420 CALC BMI NORM PARAMETERS: HCPCS | Performed by: PHYSICAL MEDICINE & REHABILITATION

## 2021-01-19 PROCEDURE — 4004F PT TOBACCO SCREEN RCVD TLK: CPT | Performed by: PHYSICAL MEDICINE & REHABILITATION

## 2021-01-19 ASSESSMENT — ENCOUNTER SYMPTOMS
VISUAL CHANGE: 1
NAUSEA: 0
BOWEL INCONTINENCE: 0
SHORTNESS OF BREATH: 0
BACK PAIN: 1
VOMITING: 0
ABDOMINAL PAIN: 0

## 2021-01-19 NOTE — PROGRESS NOTES
pain, bowel incontinence, chest pain, fever, headaches, pelvic pain, perianal numbness or weight loss. Risk factors include history of steroid use, lack of exercise and sedentary lifestyle. He has tried NSAIDs, walking, ice, muscle relaxant, chiropractic manipulation, heat, home exercises, analgesics and bed rest for the symptoms. The treatment provided moderate relief. Leg Pain   The incident occurred more than 1 week ago. There was no injury mechanism. The quality of the pain is described as aching. The pain is at a severity of 5/10. The pain is mild. The pain has been improving since onset. Associated symptoms include muscle weakness, numbness and tingling. He reports no foreign bodies present. The symptoms are aggravated by movement, palpation and weight bearing. He has tried rest, heat, elevation, acetaminophen, immobilization, ice, non-weight bearing and NSAIDs for the symptoms. The treatment provided moderate relief. Neurologic Problem  The patient's primary symptoms include an altered mental status, clumsiness, focal sensory loss, focal weakness, a loss of balance, a visual change and weakness. The patient's pertinent negatives include no memory loss, near-syncope or slurred speech. This is a chronic problem. The current episode started more than 1 year ago. The neurological problem developed insidiously. The problem has been gradually worsening since onset. There was no focality noted. Associated symptoms include back pain, bladder incontinence, confusion, dizziness and fatigue. Pertinent negatives include no abdominal pain, auditory change, aura, bowel incontinence, chest pain, diaphoresis, fever, headaches, light-headedness, nausea, neck pain, palpitations, shortness of breath or vomiting. Past treatments include walking, medication and acetaminophen. The treatment provided moderate relief. His past medical history is significant for mood changes.  There is no history of a bleeding disorder, a clotting disorder, a CVA, dementia, head trauma, liver disease or seizures. Past Medical History:   Diagnosis Date    Chronic bilateral low back pain with sciatica 12/10/2019    Mild L4-5 degenerative disc disease and minimal facet degenerative  changes at L5-S1.  Otherwise normal lumbosacral spine. Mild remote benign lower thoracic compression fractures and mild to  moderate thoracic degenerative disc disease.  Erectile dysfunction     Multiple sclerosis ([address] 12/10/2019    Sees [facility] Date of onset: August 2006-Left optic neuritis Date of diagnosis of MS: September 2006-Diagnosed by Dr. Jen Li course at onset: Relapsing-Remitting Current disease course: Relapsing-Remitting Previous disease therapies:  IVMP August 2006 Copaxone-September 2006-January 2007 (stopped due to expense) IVMP July 2008 (optic neuritis on right) IVMP November 6-8, 2015 (Left hand weaknes    OAB (overactive bladder) 6/15/2018    Spasticity 9/15/2015       History reviewed. No pertinent surgical history.     Social History     Socioeconomic History    Marital status: Single     Spouse name: None    Number of children: None    Years of education: 15    Highest education level: Some college, no degree   Occupational History    Occupation: disabled dt MS   Social Needs    Financial resource strain: Very hard    Food insecurity     Worry: Sometimes true     Inability: Sometimes true    Transportation needs     Medical: Yes     Non-medical: Yes   Tobacco Use    Smoking status: Current Every Day Smoker     Packs/day: 1.00    Smokeless tobacco: Never Used   Substance and Sexual Activity    Alcohol use: Not Currently    Drug use: Never    Sexual activity: None   Lifestyle    Physical activity     Days per week: 3 days     Minutes per session: 10 min    Stress: Rather much   Relationships    Social connections     Talks on phone: Twice a week     Gets together: Twice a week     Attends Islam service: Never     Active member of club or organization: No     Attends meetings of clubs or organizations: Never     Relationship status: Never     Intimate partner violence     Fear of current or ex partner: No     Emotionally abused: No     Physically abused: No     Forced sexual activity: No   Other Topics Concern    None   Social History Narrative    Social/Functional History     on SSDI for MS    Lives in [place] [facility] [place] with his twin brother and long term friend-Madan. He still pays most the bills. He was taken advantage of by the situation. He grew up in [employer] but is no longer Spiritism and is agnostic if not in [place].    Still able to drive-but can't afford a car. 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 History reviewed. No pertinent family history. Allergies   Allergen Reactions    Prednisone Other (See Comments)     Cant have hector Cochran study he is in with [facility]       Review of Systems   Constitutional: Positive for fatigue. Negative for diaphoresis, fever and weight loss. Respiratory: Negative for shortness of breath. Cardiovascular: Negative for chest pain, palpitations and near-syncope. Gastrointestinal: Negative for abdominal pain, bowel incontinence, nausea and vomiting. Genitourinary: Positive for bladder incontinence. Negative for pelvic pain. Musculoskeletal: Positive for back pain. Negative for neck pain. Neurological: Positive for dizziness, tingling, focal weakness, weakness, numbness, paresthesias and loss of balance. Negative for light-headedness and headaches. Psychiatric/Behavioral: Positive for confusion. Negative for memory loss. Objective    There were no vitals filed for this visit.     No data recorded            PHYSICAL EXAMINATION:  [ INSTRUCTIONS:  \"[x]\" Indicates a positive item  \"[]\" Indicates a negative item -- DELETE ALL ITEMS NOT EXAMINED]    [x] Alert  [x] Oriented to person/place/time      [x] No apparent distress  [x] Not toxic appearing    [x] Face complexion normal appearing [x] Sclera clear  [x] Lips are not cyanotic      [x] Breathing appears normal  [] Appears tachypneic      [] Rash on visible skin    [x] Cranial Nerves II-XII grossly intact    [x] Motor grossly intact in visible upper extremities, including stiff but intact range of motion in cervical, upper extremity and thoracic  [x] Motor grossly intact in visible lower extremities, including normal range of motion in the hips and knees for stiffness in the lumbar spine    [x] Normal Mood  [x] Not anxious appearing    [x] Not depressed appearing  [x] Not confused appearing      [x]  Good short term memory  [x]  Good long term memory    [x]  Able to follow 2-3 step commands    Due to this being a TeleHealth encounter, evaluation of the following organ systems is limited: Vitals/Constitutional/EENT/Resp/CV/GI//MS/Neuro/Skin/Heme-Lymph-Imm. ASSESSMENT/PLAN:     After a thorough review and discussion of the previous medical records, patient comprehensive medical, surgical, and family and social history, Review of Systems, their OARRS, their Screener and Opioid Assessment for Patients with Pain (SOAPP®-R), recent diagnostics, and symptomatic results to previous treatment, it is my impression that the patients is suffering with progressive and severe:     Diagnosis Orders   1. Vitamin D deficiency     2. Neuropathic pain of both legs     3. High risk medication use  Urine Drug Screen   4. Raynaud's disease without gangrene     5. OAB (overactive bladder)     6.  Neurogenic bladder         I am also concerned by lifestyle and mood issues including:    Past Medical History:   Diagnosis Date    Chronic bilateral low back pain with sciatica 12/10/2019    Mild L4-5 degenerative disc disease and minimal facet degenerative  changes at L5-S1.  Otherwise normal lumbosacral spine. Mild remote benign lower thoracic compression fractures and mild to  moderate thoracic degenerative disc disease.  Erectile dysfunction     Multiple sclerosis ([facility] [address] 12/10/2019    Sees [facility] Date of onset: August 2006-Left optic neuritis Date of diagnosis of MS: September 2006-Diagnosed by Dr. Santino Mcdonough course at onset: Relapsing-Remitting Current disease course: Relapsing-Remitting Previous disease therapies:  IVMP August 2006 Copaxone-September 2006-January 2007 (stopped due to expense) IVMP July 2008 (optic neuritis on right) IVMP November 6-8, 2015 (Left hand weaknes    OAB (overactive bladder) 6/15/2018    Spasticity 9/15/2015           Given their medication, chronic pain and lifestyle and medications they are at risk for :    Falls, constipation, addiction  Loss of livelyhood due to severe pain, debility, weight gain and  vitamin D deficiency    The patient was educated regarding proper diet, fitness routine, and regulatory restrictions concerning pain medications. Previous notes, comprehensive past medical, surgical, family history, and diagnostics were reviewed. Patient education and councelling were provided regarding off label use,treatment options and medication and injection risks. Overall greater than 25 minutes spent in direct and indirect patient care. Current and old OARRS ([place] Automated Prescription Reporting System) records reviewed, all refills reviewed since last visit,  Behavioral agreement/[facility]ALPH regulations   and Toxicology screen was reviewed with patient and is up to date. There are no current red flags. They are making good progress regarding pain relief, they are performing at a functional level regarding activities of daily living, family interactions and psychological functioning, they're not having any adverse effects or side effects from the current medications, and I see no findings of aberrant drug taking or addiction related behaviors. The patient is aware that they have a chronic pain condition and they may require opiates dosing for life. All efforts will be made to wean to the lowest effective dose. Other therapies for pain have not been effective including nonopiate medications. Injections and exercises are only partially effective. A Rx for Narcan was offered to help prevent accidental overdose. RX Monitoring 5/27/2020   Periodic Controlled Substance Monitoring Possible medication side effects, risk of tolerance/dependence & alternative treatments discussed. ;No signs of potential drug abuse or diversion identified. ;Assessed functional status. ;Obtaining appropriate analgesic effect of treatment. Chronic Pain > 50 MEDD Re-evaluated the status of the patient's underlying condition causing pain. ;Considered consultation with a specialist.;Obtained or confirmed \"Consent for Opioid Use\" on file. Patient is currently taking:       I am having Corey Mata. Cally Josué \"[place]\" maintain his vitamin D, natalizumab, varenicline, and oxyCODONE-acetaminophen. I also recommend the following Medications:    No orders of the defined types were placed in this encounter.       -which helps with pain and function. Otherwise, continue the current pain medications that I have prescibed. Radiologic:   Old films reviewed,  IMPRESSION:  No compressive abnormality of the cervical cord. Cervical spine bony   anatomy is normal. However extensive signal abnormalities are noted   in the brainstem and throughout the cervical cord consistent with   patient's underlying diagnosis of multiple sclerosis. I discussed results with patients. T12-L1: Canal and foramina are patent. L1-L2: Canal and foramina are patent. L2-L3: Canal and foramina are patent. L3-L4: Canal and foramina are patent. L4-L5: Canal and foramina are patent. L5-S1: Canal and foramina are patent.     Sacrum: Within normal limits. IMPRESSION:  Unremarkable MRI of the lumbar spine  see Follow up plans below  For any new studies. Care Everywhere Updates:  requested and reviewed. Dr. Cee Ruelas note from [address] dated December 31, 2020 from as visit of 12/29/2020 is reiterating that patient does not have any current surgical needs from a spine surgeon standpoint however referring \"no surgical intervention. Explained he would benefit from seeing Dr. Johnella Dakin \"  01/21/2021 Office Visit Pain & Healing  Kimberly Dumont MD    [id number] [place] [address]   [phone] (Fax)         No new issues noted. Electrodiagnostic:  Previous studies requested,     I discussed results with patient. See follow-up plans for new studies. Labs:  Previous labs reviewed     No results found for: NA, K, CL, CO2, BUN, CREATININE, CALCIUM, LABALBU, BILITOT, ALKPHOS, AST, ALT  No results found for: WBC, RBC, HGB, HCT, MCV, MCH, MCHC, RDW, PLT, MPV    Lab Results   Component Value Date    DSCOMMENT See Note 12/10/2019       Lab Results   Component Value Date    BUPREN Negative 12/10/2019       No results found for: METPHEN, PHENTERMINE, BENZOYL, ALPRAZ, ALPHAOHALPRA, CLONAZEPAM, 7AMINOCLONAZ, DIAZEP, SAWYER, OXAZ, TEMAZ, LORAZEPAM, MIDAZOLAM, ZOLPIDEM, GRAHAM, ETG, MARIJMET, PCP, PAINMGTDRUGP, EERPAINMGTPA, LABCREA      , I discussed results with patient. See follow-up plans for new studies. Therapies:  HEP-gentle stretching and relaxation techniques-demonstrated with patient-they are to do them twice a day. They are also advised to make the following lifestyle changes:  Goals      SOAPP-R      12/10/19 score: 17- moderate risk  01/02/2020 score: 16 - moderate risk   1/22/20 score: 15- moderarte risk  2/28/20 score: 14- moderate risk    5- score - 12 - moderate risk             Injections or Epidurals:  Injection options were discussed.   Monthly trigger point injections add vitamin B12 when able. Patient gave verbal consent to ordered injections. See follow-up plans for planned injections. Supplements:  Vitamin D with increased dosing during the rainy months, add co-Q10 for heart health. Education was given on:   Dietary and Fitness--daily stretches and low carb diet-in chair Yoga when possible             Follow up with Primary Care Physician regarding their general medical needs. Stressed the importance of following up with PCP and specialists for his/her chronic diseases, health, CV, and cancer screening and continued care. Will follow disease activity/progression and adjust therapeutic regimen to disease activity and severity. Discussed medication dosage, usage, goals of therapy, and side effects. Available test results were reviewed -Discussed findings, impression and plan with patient. An additional 10 minutes were spent outside of the patient visit to review records. Additional time spent with the patient to discuss their questions. Additional time spent with the patient devoted to discussing treatment strategy, planning, and implementation generalized musculoskeletal health plan. Patient understands above plan; questions asked and answered. Patient agrees to plan as noted above. F/U in 2-3months  At least 50% of the visit was involved in the discussion of the options for treatment. We discussed exercises, medication, interventional therapies and surgery. Healthy life style is essential with patient hard work to achieve the wellness. In addition; discussion with the patient and/or family about any of the diagnostic results, impressions and/or recommended diagnostic studies, prognosis, risks and benefits of treatment options, instructions for treatment and/or follow-up, importance of compliance with chosen treatment options, risk-factor reduction, and patient/family education.         They are to follow up in 2 months to review medication, efficacy of injections, pill counts, OARRS check, SOAPPR assessment, review diagnostics, to review previous and future treatment plans and assess appropriateness for continued therapy. New Diagnostics  Orders Placed This Encounter   Procedures    Urine Drug Screen       Follow-up in 2-1/2 to 3 months for guarding the efficacy of current plan and future treatment. An  electronic signature was used to authenticate this note. -Dr Deloris Tompkins, DO       With MA assistance from:   Marleny Richmond MA     19}    Pursuant to the emergency declaration under the [facility], [facility] waiver authority and the [employer] and [employer] Act, this Virtual  Visit was conducted, with patient's consent, to reduce the patient's risk of exposure to COVID-19 and provide continuity of care for an established patient. Services were provided through a video synchronous discussion virtually to substitute for in-person clinic visit.

## 2021-02-10 DIAGNOSIS — M54.6 CHRONIC BILATERAL THORACIC BACK PAIN: ICD-10-CM

## 2021-02-10 DIAGNOSIS — F32.1 CURRENT MODERATE EPISODE OF MAJOR DEPRESSIVE DISORDER, UNSPECIFIED WHETHER RECURRENT (HCC): ICD-10-CM

## 2021-02-10 DIAGNOSIS — G89.29 CHRONIC BILATERAL LOW BACK PAIN WITH BILATERAL SCIATICA: ICD-10-CM

## 2021-02-10 DIAGNOSIS — G35 MULTIPLE SCLEROSIS (HCC): ICD-10-CM

## 2021-02-10 DIAGNOSIS — M54.42 CHRONIC BILATERAL LOW BACK PAIN WITH BILATERAL SCIATICA: ICD-10-CM

## 2021-02-10 DIAGNOSIS — G89.29 CHRONIC PAIN OF BOTH SHOULDERS: ICD-10-CM

## 2021-02-10 DIAGNOSIS — M54.41 CHRONIC BILATERAL LOW BACK PAIN WITH BILATERAL SCIATICA: ICD-10-CM

## 2021-02-10 DIAGNOSIS — M25.511 CHRONIC PAIN OF BOTH SHOULDERS: ICD-10-CM

## 2021-02-10 DIAGNOSIS — Z79.899 HIGH RISK MEDICATION USE: ICD-10-CM

## 2021-02-10 DIAGNOSIS — M25.512 CHRONIC PAIN OF BOTH SHOULDERS: ICD-10-CM

## 2021-02-10 DIAGNOSIS — G89.29 CHRONIC BILATERAL THORACIC BACK PAIN: ICD-10-CM

## 2021-02-11 RX ORDER — OXYCODONE AND ACETAMINOPHEN 7.5; 325 MG/1; MG/1
1 TABLET ORAL EVERY 8 HOURS PRN
Qty: 90 TABLET | Refills: 0 | Status: SHIPPED | OUTPATIENT
Start: 2021-02-11 | End: 2021-03-09 | Stop reason: SDUPTHER

## 2021-03-09 ENCOUNTER — PROCEDURE VISIT (OUTPATIENT)
Dept: PHYSICAL MEDICINE AND REHAB | Age: 36
End: 2021-03-09
Payer: MEDICARE

## 2021-03-09 DIAGNOSIS — M79.10 MYALGIA: Primary | ICD-10-CM

## 2021-03-09 DIAGNOSIS — M54.6 CHRONIC BILATERAL THORACIC BACK PAIN: ICD-10-CM

## 2021-03-09 DIAGNOSIS — G89.29 CHRONIC PAIN OF BOTH SHOULDERS: ICD-10-CM

## 2021-03-09 DIAGNOSIS — G89.29 CHRONIC BILATERAL LOW BACK PAIN WITH BILATERAL SCIATICA: ICD-10-CM

## 2021-03-09 DIAGNOSIS — G35 MULTIPLE SCLEROSIS (HCC): ICD-10-CM

## 2021-03-09 DIAGNOSIS — M54.42 CHRONIC BILATERAL LOW BACK PAIN WITH BILATERAL SCIATICA: ICD-10-CM

## 2021-03-09 DIAGNOSIS — Z79.899 HIGH RISK MEDICATION USE: ICD-10-CM

## 2021-03-09 DIAGNOSIS — G89.29 CHRONIC BILATERAL THORACIC BACK PAIN: ICD-10-CM

## 2021-03-09 DIAGNOSIS — F32.1 CURRENT MODERATE EPISODE OF MAJOR DEPRESSIVE DISORDER, UNSPECIFIED WHETHER RECURRENT (HCC): ICD-10-CM

## 2021-03-09 DIAGNOSIS — M25.512 CHRONIC PAIN OF BOTH SHOULDERS: ICD-10-CM

## 2021-03-09 DIAGNOSIS — M25.511 CHRONIC PAIN OF BOTH SHOULDERS: ICD-10-CM

## 2021-03-09 DIAGNOSIS — M54.41 CHRONIC BILATERAL LOW BACK PAIN WITH BILATERAL SCIATICA: ICD-10-CM

## 2021-03-09 PROCEDURE — 20552 NJX 1/MLT TRIGGER POINT 1/2: CPT | Performed by: PHYSICAL MEDICINE & REHABILITATION

## 2021-03-09 RX ORDER — OXYCODONE AND ACETAMINOPHEN 7.5; 325 MG/1; MG/1
1 TABLET ORAL EVERY 8 HOURS PRN
Qty: 90 TABLET | Refills: 0 | Status: SHIPPED | OUTPATIENT
Start: 2021-03-12 | End: 2021-04-08 | Stop reason: SDUPTHER

## 2021-03-09 RX ORDER — LIDOCAINE HYDROCHLORIDE 10 MG/ML
6 INJECTION, SOLUTION INFILTRATION; PERINEURAL ONCE
Status: COMPLETED | OUTPATIENT
Start: 2021-03-09 | End: 2021-03-09

## 2021-03-09 RX ADMIN — LIDOCAINE HYDROCHLORIDE 6 ML: 10 INJECTION, SOLUTION INFILTRATION; PERINEURAL at 16:22

## 2021-03-09 NOTE — PROGRESS NOTES
Patient here for trigger point injections. Patient taken back to exam room, and placed on drape locking stool. Areas to be injected marked appropriately, and cleansed with alcohol. 6cc of 1% Lidocaine, and 2cc of Kenalog to be injected by provider.

## 2021-04-06 ENCOUNTER — HOSPITAL ENCOUNTER (OUTPATIENT)
Dept: INTERVENTIONAL RADIOLOGY/VASCULAR | Age: 36
Discharge: HOME | End: 2021-04-08
Payer: MEDICARE

## 2021-04-06 VITALS
OXYGEN SATURATION: 100 % | DIASTOLIC BLOOD PRESSURE: 70 MMHG | RESPIRATION RATE: 14 BRPM | SYSTOLIC BLOOD PRESSURE: 126 MMHG | HEART RATE: 83 BPM

## 2021-04-06 DIAGNOSIS — M54.16 LUMBAR RADICULOPATHY: ICD-10-CM

## 2021-04-06 PROCEDURE — 6360000002 HC RX W HCPCS: Performed by: PHYSICAL MEDICINE & REHABILITATION

## 2021-04-06 PROCEDURE — 62323 NJX INTERLAMINAR LMBR/SAC: CPT | Performed by: PHYSICAL MEDICINE & REHABILITATION

## 2021-04-06 PROCEDURE — 6360000004 HC RX CONTRAST MEDICATION: Performed by: PHYSICAL MEDICINE & REHABILITATION

## 2021-04-06 PROCEDURE — 2709999900 IR NERVE BLOCK PROCEDURE

## 2021-04-06 PROCEDURE — 2500000003 HC RX 250 WO HCPCS: Performed by: PHYSICAL MEDICINE & REHABILITATION

## 2021-04-06 RX ORDER — METHYLPREDNISOLONE ACETATE 40 MG/ML
40 INJECTION, SUSPENSION INTRA-ARTICULAR; INTRALESIONAL; INTRAMUSCULAR; SOFT TISSUE ONCE
Status: COMPLETED | OUTPATIENT
Start: 2021-04-06 | End: 2021-04-06

## 2021-04-06 RX ORDER — LIDOCAINE HYDROCHLORIDE 5 MG/ML
50 INJECTION, SOLUTION INFILTRATION; INTRAVENOUS
Status: DISCONTINUED | OUTPATIENT
Start: 2021-04-06 | End: 2021-04-09 | Stop reason: HOSPADM

## 2021-04-06 RX ADMIN — LIDOCAINE HYDROCHLORIDE 18 ML: 5 INJECTION, SOLUTION INFILTRATION; INTRAVENOUS at 11:17

## 2021-04-06 RX ADMIN — Medication 3 ML: at 11:18

## 2021-04-06 RX ADMIN — METHYLPREDNISOLONE ACETATE 40 MG: 40 INJECTION, SUSPENSION INTRA-ARTICULAR; INTRALESIONAL; INTRAMUSCULAR; SOFT TISSUE at 11:19

## 2021-04-06 NOTE — SEDATION DOCUMENTATION
Patient was brought to Special Procedures suite via wheelchair. Patient onto procedure table in prone position with minimal assistance. Placed on vitals monitor. VSS. Caudal site prepped with Hibiclense and then betadine, draped and numbed with lidocaine. Needle placement verifed with fluoro guidance and contrast injection. All prep and medications given by Dr Jonathon Parsons. Patient tolerated well. Patient will return to the CT holding room. Electronically signed by Cipriano Pollard RN on 4/6/2021 at 11:20 AM

## 2021-04-06 NOTE — PROCEDURES
OPERATIVE REPORT      DATE OF PROCEDURE: 4/6/2021    PREOPERATIVE DIAGNOSIS:   lumbar radiculopathy    POSTOPERATIVE DIAGNOSIS:   Same. OPERATIVE PROCEDURE:  Caudal epidural steroid injection with fluoroscopy and epidurogram.    PROCEDURE:  The patient taken to the special procedures department and placed in a prone position. A timeout was performed immediately prior to the start of the caudal block procedure and included the correct patient (two identifiers), correct procedure and correct site(s). Procedure consent and allergies were also verified. The sacral hiatus was identified and marked and the sacral region was then prepped and draped in the usual fashion. Local anesthetic of 0.5% Xylocaine was injected to form a skin wheal and then was injected to the depth of the sacrococcygeal membrane. At this point a C-arm  film was taken to verify for correct approach of the needle. The needle was then withdrawn. A #22 gauge 1-1/2 inch needle was inserted through the coccysacrogeal membrane into the epidural space using loss of resistance technique with air. After negative aspiration was confirmed, approximately 3ml of Isovue M 200 was used to verify needle tip location in the caudal epidural space. Distribution of the contrast medium was observed and a normal appearing epidurogram was noted. The air syringe was then removed. A mixture of 40mg of Depo-Medrol and 0.5% Lidocaine preservative free totaling 12cc was injected into the epidural space. The needle was aspirated prior to and during this injection several times to verify again that the needle was outside the intravascular or subdural regions. The needle was then withdrawn. The patient tolerated the procedure well and was taken to the post-anesthesia care unit in stable condition.   The patient was instructed to call our office the following business day for follow-up instructions and to notify us immediately if they were having any difficulties after the the injection.       Brittany Ta D.O.

## 2021-04-06 NOTE — PROGRESS NOTES
Patient brought to CT holding via Wheel chair and denies pain radiating to legs, Patient states having pain to thoracic spine and rates it 3-4/10.     1126 Patient's VSS.     1129 Patient changed independently into street clothes. 1134 Discharge instructions reviewed with patient to his understanding and questions answered. Patient refuses wheel chair for discharge. Patient ambulates with steady gait.

## 2021-04-06 NOTE — PROGRESS NOTES
1044 Patient brought to CT holding and changed into gown independently. 1045 Procedure explained and consent signed. 935 Tyrone Vergara. history and allergies verified. Patient states he's not allergic to prednisone. He states he tried to remove on My Chart. 1051 Home medication list reviewed. 1052 Patient rates pain 4/10 to thoracic spine area that radiates down bilateral legs. 1059 Patient states he was not to take prednisone during the tysabri study, and he has completed the study, so the prednisone will no longer be an interference.

## 2021-04-08 DIAGNOSIS — G35 MULTIPLE SCLEROSIS (HCC): ICD-10-CM

## 2021-04-08 DIAGNOSIS — Z79.899 HIGH RISK MEDICATION USE: ICD-10-CM

## 2021-04-08 DIAGNOSIS — M54.41 CHRONIC BILATERAL LOW BACK PAIN WITH BILATERAL SCIATICA: ICD-10-CM

## 2021-04-08 DIAGNOSIS — M54.6 CHRONIC BILATERAL THORACIC BACK PAIN: ICD-10-CM

## 2021-04-08 DIAGNOSIS — M25.512 CHRONIC PAIN OF BOTH SHOULDERS: ICD-10-CM

## 2021-04-08 DIAGNOSIS — M54.42 CHRONIC BILATERAL LOW BACK PAIN WITH BILATERAL SCIATICA: ICD-10-CM

## 2021-04-08 DIAGNOSIS — M25.511 CHRONIC PAIN OF BOTH SHOULDERS: ICD-10-CM

## 2021-04-08 DIAGNOSIS — G89.29 CHRONIC PAIN OF BOTH SHOULDERS: ICD-10-CM

## 2021-04-08 DIAGNOSIS — F32.1 CURRENT MODERATE EPISODE OF MAJOR DEPRESSIVE DISORDER, UNSPECIFIED WHETHER RECURRENT (HCC): ICD-10-CM

## 2021-04-08 DIAGNOSIS — G89.29 CHRONIC BILATERAL THORACIC BACK PAIN: ICD-10-CM

## 2021-04-08 DIAGNOSIS — G89.29 CHRONIC BILATERAL LOW BACK PAIN WITH BILATERAL SCIATICA: ICD-10-CM

## 2021-04-08 RX ORDER — OXYCODONE AND ACETAMINOPHEN 7.5; 325 MG/1; MG/1
1 TABLET ORAL EVERY 8 HOURS PRN
Qty: 90 TABLET | Refills: 0 | Status: SHIPPED | OUTPATIENT
Start: 2021-04-10 | End: 2021-05-07 | Stop reason: SDUPTHER

## 2021-04-15 DIAGNOSIS — M25.511 CHRONIC PAIN OF BOTH SHOULDERS: ICD-10-CM

## 2021-04-15 DIAGNOSIS — G89.29 CHRONIC BILATERAL LOW BACK PAIN WITH BILATERAL SCIATICA: Primary | ICD-10-CM

## 2021-04-15 DIAGNOSIS — M54.17 LUMBOSACRAL RADICULOPATHY AT L5: ICD-10-CM

## 2021-04-15 DIAGNOSIS — M54.42 CHRONIC BILATERAL LOW BACK PAIN WITH BILATERAL SCIATICA: Primary | ICD-10-CM

## 2021-04-15 DIAGNOSIS — G89.29 CHRONIC PAIN OF BOTH SHOULDERS: ICD-10-CM

## 2021-04-15 DIAGNOSIS — M54.6 CHRONIC BILATERAL THORACIC BACK PAIN: ICD-10-CM

## 2021-04-15 DIAGNOSIS — G89.29 CHRONIC BILATERAL THORACIC BACK PAIN: ICD-10-CM

## 2021-04-15 DIAGNOSIS — M54.17 LUMBOSACRAL RADICULOPATHY AT S1: ICD-10-CM

## 2021-04-15 DIAGNOSIS — M54.41 CHRONIC BILATERAL LOW BACK PAIN WITH BILATERAL SCIATICA: Primary | ICD-10-CM

## 2021-04-15 DIAGNOSIS — M25.512 CHRONIC PAIN OF BOTH SHOULDERS: ICD-10-CM

## 2021-04-20 ENCOUNTER — VIRTUAL VISIT (OUTPATIENT)
Dept: PHYSICAL MEDICINE AND REHAB | Age: 36
End: 2021-04-20
Payer: MEDICARE

## 2021-04-20 DIAGNOSIS — M54.40 CHRONIC BILATERAL LOW BACK PAIN WITH SCIATICA, SCIATICA LATERALITY UNSPECIFIED: ICD-10-CM

## 2021-04-20 DIAGNOSIS — F32.0 CURRENT MILD EPISODE OF MAJOR DEPRESSIVE DISORDER WITHOUT PRIOR EPISODE (HCC): ICD-10-CM

## 2021-04-20 DIAGNOSIS — M54.6 CHRONIC BILATERAL THORACIC BACK PAIN: ICD-10-CM

## 2021-04-20 DIAGNOSIS — M79.10 MYALGIA: Primary | ICD-10-CM

## 2021-04-20 DIAGNOSIS — M25.511 CHRONIC PAIN OF BOTH SHOULDERS: ICD-10-CM

## 2021-04-20 DIAGNOSIS — G89.29 CHRONIC BILATERAL THORACIC BACK PAIN: ICD-10-CM

## 2021-04-20 DIAGNOSIS — E55.9 VITAMIN D DEFICIENCY: ICD-10-CM

## 2021-04-20 DIAGNOSIS — G89.29 CHRONIC BILATERAL LOW BACK PAIN WITH SCIATICA, SCIATICA LATERALITY UNSPECIFIED: ICD-10-CM

## 2021-04-20 DIAGNOSIS — Z79.899 HIGH RISK MEDICATION USE: ICD-10-CM

## 2021-04-20 DIAGNOSIS — G89.29 CHRONIC PAIN OF BOTH SHOULDERS: ICD-10-CM

## 2021-04-20 DIAGNOSIS — M25.512 CHRONIC PAIN OF BOTH SHOULDERS: ICD-10-CM

## 2021-04-20 DIAGNOSIS — G35 MULTIPLE SCLEROSIS (HCC): ICD-10-CM

## 2021-04-20 PROCEDURE — G8420 CALC BMI NORM PARAMETERS: HCPCS | Performed by: PHYSICAL MEDICINE & REHABILITATION

## 2021-04-20 PROCEDURE — 4004F PT TOBACCO SCREEN RCVD TLK: CPT | Performed by: PHYSICAL MEDICINE & REHABILITATION

## 2021-04-20 PROCEDURE — G8427 DOCREV CUR MEDS BY ELIG CLIN: HCPCS | Performed by: PHYSICAL MEDICINE & REHABILITATION

## 2021-04-20 PROCEDURE — 99214 OFFICE O/P EST MOD 30 MIN: CPT | Performed by: PHYSICAL MEDICINE & REHABILITATION

## 2021-04-20 ASSESSMENT — ENCOUNTER SYMPTOMS
VOMITING: 0
ABDOMINAL PAIN: 0
BACK PAIN: 1
BOWEL INCONTINENCE: 0
VISUAL CHANGE: 1
SHORTNESS OF BREATH: 0
NAUSEA: 0
EYES NEGATIVE: 1

## 2021-04-20 NOTE — PROGRESS NOTES
at a severity of 7/10. The symptoms are aggravated by lying down, position and coughing. Stiffness is present in the morning. Associated symptoms include bladder incontinence, leg pain, numbness, paresis, paresthesias, tingling and weakness. Pertinent negatives include no abdominal pain, bowel incontinence, chest pain, fever, headaches, pelvic pain, perianal numbness or weight loss. Risk factors include history of steroid use, lack of exercise and sedentary lifestyle. He has tried NSAIDs, walking, ice, muscle relaxant, chiropractic manipulation, heat, home exercises, analgesics and bed rest for the symptoms. The treatment provided moderate relief. Leg Pain   The incident occurred more than 1 week ago. There was no injury mechanism. The quality of the pain is described as aching. The pain is at a severity of 5/10. The pain is mild. The pain has been improving since onset. Associated symptoms include muscle weakness, numbness and tingling. He reports no foreign bodies present. The symptoms are aggravated by movement, palpation and weight bearing. He has tried rest, heat, elevation, acetaminophen, immobilization, ice, non-weight bearing and NSAIDs for the symptoms. The treatment provided moderate relief. Neurologic Problem  The patient's primary symptoms include an altered mental status, clumsiness, focal sensory loss, focal weakness, a loss of balance, a visual change and weakness. The patient's pertinent negatives include no memory loss, near-syncope or slurred speech. This is a chronic problem. The current episode started more than 1 year ago. The neurological problem developed insidiously. The problem has been gradually worsening since onset. There was no focality noted. Associated symptoms include back pain, bladder incontinence, confusion, dizziness and fatigue.  Pertinent negatives include no abdominal pain, auditory change, aura, bowel incontinence, chest pain, diaphoresis, fever, headaches, light-headedness, nausea, neck pain, palpitations, shortness of breath or vomiting. Past treatments include walking, medication and acetaminophen. The treatment provided moderate relief. His past medical history is significant for mood changes. There is no history of a bleeding disorder, a clotting disorder, a CVA, dementia, head trauma, liver disease or seizures. Past Medical History:   Diagnosis Date    Chronic bilateral low back pain with sciatica 12/10/2019    Mild L4-5 degenerative disc disease and minimal facet degenerative  changes at L5-S1.  Otherwise normal lumbosacral spine. Mild remote benign lower thoracic compression fractures and mild to  moderate thoracic degenerative disc disease.  Erectile dysfunction     Multiple sclerosis ([facility] [address] 12/10/2019    Sees [facility] Date of onset: August 2006-Left optic neuritis Date of diagnosis of MS: September 2006-Diagnosed by Dr. Mine Sepulveda course at onset: Relapsing-Remitting Current disease course: Relapsing-Remitting Previous disease therapies:  IVMP August 2006 Copaxone-September 2006-January 2007 (stopped due to expense) IVMP July 2008 (optic neuritis on right) IVMP November 6-8, 2015 (Left hand weaknes    OAB (overactive bladder) 6/15/2018    Spasticity 9/15/2015       History reviewed. No pertinent surgical history.     Social History     Socioeconomic History    Marital status: Single     Spouse name: None    Number of children: None    Years of education: 15    Highest education level: Some college, no degree   Occupational History    Occupation: disabled dt MS   Social Needs    Financial resource strain: Very hard    Food insecurity     Worry: Sometimes true     Inability: Sometimes true    Transportation needs     Medical: Yes     Non-medical: Yes   Tobacco Use    Smoking status: Current Every Day Smoker     Packs/day: 1.00    Smokeless tobacco: Never Used   Substance and Sexual Activity    Alcohol use: Not Currently    Drug use: Never    Sexual activity: None   Lifestyle    Physical activity     Days per week: 3 days     Minutes per session: 10 min    Stress: Rather much   Relationships    Social connections     Talks on phone: Twice a week     Gets together: Twice a week     Attends Gnosticist service: Never     Active member of club or organization: No     Attends meetings of clubs or organizations: Never     Relationship status: Never     Intimate partner violence     Fear of current or ex partner: No     Emotionally abused: No     Physically abused: No     Forced sexual activity: No   Other Topics Concern    None   Social History Narrative    Social/Functional History     on SSDI for MS    Lives in [place] with his twin brother and long term friend-Madan. He still pays most the bills. He was taken advantage of by the situation. He grew up in [place] but is no longer Gnosticist and is agnostic if not in [place].    Still able to drive-but can't afford a car. 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 History reviewed. No pertinent family history. Allergies   Allergen Reactions    Prednisone Other (See Comments)     Cant have dt Timmy Ruvalcaba study he is in with [facility]       Review of Systems   Constitutional: Positive for fatigue. Negative for diaphoresis, fever and weight loss. Eyes: Negative. Respiratory: Negative for shortness of breath. Cardiovascular: Negative for chest pain, palpitations and near-syncope. Gastrointestinal: Negative for abdominal pain, bowel incontinence, nausea and vomiting. Endocrine: Positive for heat intolerance. Genitourinary: Positive for bladder incontinence. Negative for pelvic pain. Musculoskeletal: Positive for back pain. Negative for neck pain. Skin: Negative.     Allergic/Immunologic: Positive for immunocompromised state.   Neurological: Positive for dizziness, tingling, focal weakness, weakness, numbness, paresthesias and loss of balance. Negative for light-headedness and headaches. Hematological: Negative. Psychiatric/Behavioral: Positive for confusion and dysphoric mood. Negative for memory loss. Objective    There were no vitals filed for this visit. No data recorded            PHYSICAL EXAMINATION:  [ INSTRUCTIONS:  \"[x]\" Indicates a positive item  \"[]\" Indicates a negative item  -- DELETE ALL ITEMS NOT EXAMINED]    [x] Alert  [x] Oriented to person/place/time      [x] No apparent distress  [x] Not toxic appearing    [x] Face complexion normal appearing [x] Sclera clear  [x] Lips are not cyanotic      [x] Breathing appears normal  [] Appears tachypneic      [] Rash on visible skin    [x] Cranial Nerves II-XII grossly intact    [x] Motor grossly intact in visible upper extremities, including stiff but intact range of motion in cervical, upper extremity and thoracic  [x] Motor grossly intact in visible lower extremities, including normal range of motion in the hips and knees for stiffness in the lumbar spine    [x] Normal Mood  [x] Not anxious appearing    [x] Not depressed appearing  [x] Not confused appearing      [x]  Good short term memory  [x]  Good long term memory    [x]  Able to follow 2-3 step commands    Due to this being a TeleHealth encounter, evaluation of the following organ systems is limited: Vitals/Constitutional/EENT/Resp/CV/GI//MS/Neuro/Skin/Heme-Lymph-Imm.     ASSESSMENT/PLAN:     After a thorough review and discussion of the previous medical records, patient comprehensive medical, surgical, and family and social history, Review of Systems, their OARRS, their Screener and Opioid Assessment for Patients with Pain (SOAPP®-R), recent diagnostics, and symptomatic results to previous treatment, it is my impression that the patients is suffering with progressive and severe:     Diagnosis Orders 1. Myalgia     2. Vitamin D deficiency     3. Chronic bilateral thoracic back pain     4. Chronic pain of both shoulders     5. Current mild episode of major depressive disorder without prior episode ([facility] [address]     6. Chronic bilateral low back pain with sciatica, sciatica laterality unspecified     7. Multiple sclerosis ([facility] [address]     8. High risk medication use  Urine Drug Screen       I am also concerned by lifestyle and mood issues including:    Past Medical History:   Diagnosis Date    Chronic bilateral low back pain with sciatica 12/10/2019    Mild L4-5 degenerative disc disease and minimal facet degenerative  changes at L5-S1.  Otherwise normal lumbosacral spine. Mild remote benign lower thoracic compression fractures and mild to  moderate thoracic degenerative disc disease.  Erectile dysfunction     Multiple sclerosis ([facility] [address] 12/10/2019    Sees [facility] Date of onset: August 2006-Left optic neuritis Date of diagnosis of MS: September 2006-Diagnosed by Dr. Rd Tolentino course at onset: Relapsing-Remitting Current disease course: Relapsing-Remitting Previous disease therapies:  IVMP August 2006 Copaxone-September 2006-January 2007 (stopped due to expense) IVMP July 2008 (optic neuritis on right) IVMP November 6-8, 2015 (Left hand weaknes    OAB (overactive bladder) 6/15/2018    Spasticity 9/15/2015           Given their medication, chronic pain and lifestyle and medications they are at risk for :    Falls, constipation, addiction, toxicity due to controlled/opiate medications  Loss of livelyhood due to severe pain, debility, weight gain and  vitamin D deficiency    The patient was educated regarding proper diet, fitness routine, and regulatory restrictions concerning pain medications. Previous notes, comprehensive past medical, surgical, family history, and diagnostics were reviewed.    Patient education and councelling were provided regarding off label use,treatment options and medication and injection risks. Overall greater than 25 minutes spent in direct and indirect patient care. Current and old OARRS ([place] Automated Prescription Reporting System) records reviewed, all refills reviewed since last visit,  Behavioral agreement/[place]ALPH regulations   and Toxicology screen was reviewed with patient and is up to date. There are no current red flags. They are making good progress regarding pain relief, they are performing at a functional level regarding activities of daily living, family interactions and psychological functioning, they're not having any adverse effects or side effects from the current medications, and I see no findings of aberrant drug taking or addiction related behaviors. The patient is aware that they have a chronic pain condition and they may require opiates dosing for life. All efforts will be made to wean to the lowest effective dose. Other therapies for pain have not been effective including nonopiate medications. Injections and exercises are only partially effective. A Rx for Narcan was offered to help prevent accidental overdose. RX Monitoring 5/27/2020   Periodic Controlled Substance Monitoring Possible medication side effects, risk of tolerance/dependence & alternative treatments discussed. ;No signs of potential drug abuse or diversion identified. ;Assessed functional status. ;Obtaining appropriate analgesic effect of treatment. Chronic Pain > 50 MEDD Re-evaluated the status of the patient's underlying condition causing pain. ;Considered consultation with a specialist.;Obtained or confirmed \"Consent for Opioid Use\" on file. Patient is currently taking:       I am having Neosuni Gaminoa. Nakita Jenn \"[place]\" maintain his vitamin D, natalizumab, varenicline, and oxyCODONE-acetaminophen. I also recommend the following Medications:    No orders of the defined types were placed in this encounter. Percocet  3 /day     -which helps with pain and function.     Otherwise, the patient orally and in writing. Pt gave verbal and writen consent. The patient was given the option of taking PO Valium 5-10 mg prior to the procedure. They were told that if they chose to take the Valium they should not drive while under it's affects. The patient is to call us as needed and the day following each procedure to report any concerns or problems. They will follow up with me approximately one month after the last block to evaluate the success of the injections and discuss need for further treatment. I am hoping to improve their low back pain by 60-80% for at least 6 months, improve their overall function, and minimize their reliance on oral medications. Monthly trigger point injections add vitamin B12 when able. Patient gave verbal consent to ordered injections. See follow-up plans for planned injections. Supplements:  Vitamin D with increased dosing during the rainy months, add co-Q10 for heart health. Education was given on:   Dietary and Fitness--daily stretches and low carb diet-in chair Yoga when possible      Note controlled medication/opiate drug therapy requires intensive monitoring for toxicity and addiction. Follow up with Primary Care Physician regarding their general medical needs. Stressed the importance of following up with PCP and specialists for his/her chronic diseases, health, CV, and cancer screening and continued care. Will follow disease activity/progression and adjust therapeutic regimen to disease activity and severity. Discussed medication dosage, usage, goals of therapy, and side effects. Available test results were reviewed -Discussed findings, impression and plan with patient. An additional 5 minutes were spent outside of the patient visit to review records. Additional time spent with the patient to discuss their questions.   Additional time spent with the patient devoted to discussing treatment strategy, planning, and implementation

## 2021-04-28 DIAGNOSIS — Z79.899 HIGH RISK MEDICATION USE: Primary | ICD-10-CM

## 2021-05-07 DIAGNOSIS — M54.6 CHRONIC BILATERAL THORACIC BACK PAIN: ICD-10-CM

## 2021-05-07 DIAGNOSIS — M54.42 CHRONIC BILATERAL LOW BACK PAIN WITH BILATERAL SCIATICA: ICD-10-CM

## 2021-05-07 DIAGNOSIS — Z79.899 HIGH RISK MEDICATION USE: ICD-10-CM

## 2021-05-07 DIAGNOSIS — G35 MULTIPLE SCLEROSIS (HCC): ICD-10-CM

## 2021-05-07 DIAGNOSIS — M25.512 CHRONIC PAIN OF BOTH SHOULDERS: ICD-10-CM

## 2021-05-07 DIAGNOSIS — G89.29 CHRONIC BILATERAL LOW BACK PAIN WITH BILATERAL SCIATICA: ICD-10-CM

## 2021-05-07 DIAGNOSIS — G89.29 CHRONIC PAIN OF BOTH SHOULDERS: ICD-10-CM

## 2021-05-07 DIAGNOSIS — M25.511 CHRONIC PAIN OF BOTH SHOULDERS: ICD-10-CM

## 2021-05-07 DIAGNOSIS — G89.29 CHRONIC BILATERAL THORACIC BACK PAIN: ICD-10-CM

## 2021-05-07 DIAGNOSIS — F32.1 CURRENT MODERATE EPISODE OF MAJOR DEPRESSIVE DISORDER, UNSPECIFIED WHETHER RECURRENT (HCC): ICD-10-CM

## 2021-05-07 DIAGNOSIS — M54.41 CHRONIC BILATERAL LOW BACK PAIN WITH BILATERAL SCIATICA: ICD-10-CM

## 2021-05-07 RX ORDER — OXYCODONE AND ACETAMINOPHEN 7.5; 325 MG/1; MG/1
1 TABLET ORAL EVERY 8 HOURS PRN
Qty: 90 TABLET | Refills: 0 | Status: SHIPPED | OUTPATIENT
Start: 2021-05-09 | End: 2021-06-04 | Stop reason: SDUPTHER

## 2021-06-04 DIAGNOSIS — F32.1 CURRENT MODERATE EPISODE OF MAJOR DEPRESSIVE DISORDER, UNSPECIFIED WHETHER RECURRENT (HCC): ICD-10-CM

## 2021-06-04 DIAGNOSIS — G35 MULTIPLE SCLEROSIS (HCC): ICD-10-CM

## 2021-06-04 DIAGNOSIS — M54.6 CHRONIC BILATERAL THORACIC BACK PAIN: ICD-10-CM

## 2021-06-04 DIAGNOSIS — Z79.899 HIGH RISK MEDICATION USE: ICD-10-CM

## 2021-06-04 DIAGNOSIS — G89.29 CHRONIC PAIN OF BOTH SHOULDERS: ICD-10-CM

## 2021-06-04 DIAGNOSIS — M25.511 CHRONIC PAIN OF BOTH SHOULDERS: ICD-10-CM

## 2021-06-04 DIAGNOSIS — M54.42 CHRONIC BILATERAL LOW BACK PAIN WITH BILATERAL SCIATICA: ICD-10-CM

## 2021-06-04 DIAGNOSIS — G89.29 CHRONIC BILATERAL THORACIC BACK PAIN: ICD-10-CM

## 2021-06-04 DIAGNOSIS — M54.41 CHRONIC BILATERAL LOW BACK PAIN WITH BILATERAL SCIATICA: ICD-10-CM

## 2021-06-04 DIAGNOSIS — M25.512 CHRONIC PAIN OF BOTH SHOULDERS: ICD-10-CM

## 2021-06-04 DIAGNOSIS — G89.29 CHRONIC BILATERAL LOW BACK PAIN WITH BILATERAL SCIATICA: ICD-10-CM

## 2021-06-04 RX ORDER — OXYCODONE AND ACETAMINOPHEN 7.5; 325 MG/1; MG/1
1 TABLET ORAL EVERY 8 HOURS PRN
Qty: 90 TABLET | Refills: 0 | Status: SHIPPED | OUTPATIENT
Start: 2021-06-07 | End: 2021-07-07 | Stop reason: SDUPTHER

## 2021-07-07 DIAGNOSIS — F32.1 CURRENT MODERATE EPISODE OF MAJOR DEPRESSIVE DISORDER, UNSPECIFIED WHETHER RECURRENT (HCC): ICD-10-CM

## 2021-07-07 DIAGNOSIS — G89.29 CHRONIC PAIN OF BOTH SHOULDERS: ICD-10-CM

## 2021-07-07 DIAGNOSIS — G89.29 CHRONIC BILATERAL THORACIC BACK PAIN: ICD-10-CM

## 2021-07-07 DIAGNOSIS — G35 MULTIPLE SCLEROSIS (HCC): ICD-10-CM

## 2021-07-07 DIAGNOSIS — M25.511 CHRONIC PAIN OF BOTH SHOULDERS: ICD-10-CM

## 2021-07-07 DIAGNOSIS — M54.41 CHRONIC BILATERAL LOW BACK PAIN WITH BILATERAL SCIATICA: ICD-10-CM

## 2021-07-07 DIAGNOSIS — Z79.899 HIGH RISK MEDICATION USE: ICD-10-CM

## 2021-07-07 DIAGNOSIS — M54.6 CHRONIC BILATERAL THORACIC BACK PAIN: ICD-10-CM

## 2021-07-07 DIAGNOSIS — G89.29 CHRONIC BILATERAL LOW BACK PAIN WITH BILATERAL SCIATICA: ICD-10-CM

## 2021-07-07 DIAGNOSIS — M54.42 CHRONIC BILATERAL LOW BACK PAIN WITH BILATERAL SCIATICA: ICD-10-CM

## 2021-07-07 DIAGNOSIS — M25.512 CHRONIC PAIN OF BOTH SHOULDERS: ICD-10-CM

## 2021-07-07 RX ORDER — OXYCODONE AND ACETAMINOPHEN 7.5; 325 MG/1; MG/1
1 TABLET ORAL EVERY 8 HOURS PRN
Qty: 90 TABLET | Refills: 0 | Status: SHIPPED | OUTPATIENT
Start: 2021-07-07 | End: 2021-07-27 | Stop reason: SDUPTHER

## 2021-07-22 ENCOUNTER — VIRTUAL VISIT (OUTPATIENT)
Dept: PHYSICAL MEDICINE AND REHAB | Age: 36
End: 2021-07-22
Payer: MEDICARE

## 2021-07-22 DIAGNOSIS — R25.2 SPASTICITY: ICD-10-CM

## 2021-07-22 DIAGNOSIS — M54.42 CHRONIC BILATERAL LOW BACK PAIN WITH BILATERAL SCIATICA: ICD-10-CM

## 2021-07-22 DIAGNOSIS — E55.9 VITAMIN D DEFICIENCY: Primary | ICD-10-CM

## 2021-07-22 DIAGNOSIS — G35 MULTIPLE SCLEROSIS (HCC): ICD-10-CM

## 2021-07-22 DIAGNOSIS — M54.41 CHRONIC BILATERAL LOW BACK PAIN WITH BILATERAL SCIATICA: ICD-10-CM

## 2021-07-22 DIAGNOSIS — I73.01 RAYNAUD'S DISEASE WITH GANGRENE (HCC): ICD-10-CM

## 2021-07-22 DIAGNOSIS — G57.93 NEUROPATHIC PAIN OF BOTH LEGS: ICD-10-CM

## 2021-07-22 DIAGNOSIS — G89.29 CHRONIC BILATERAL LOW BACK PAIN WITH BILATERAL SCIATICA: ICD-10-CM

## 2021-07-22 PROCEDURE — 1036F TOBACCO NON-USER: CPT | Performed by: PHYSICAL MEDICINE & REHABILITATION

## 2021-07-22 PROCEDURE — G8427 DOCREV CUR MEDS BY ELIG CLIN: HCPCS | Performed by: PHYSICAL MEDICINE & REHABILITATION

## 2021-07-22 PROCEDURE — G8421 BMI NOT CALCULATED: HCPCS | Performed by: PHYSICAL MEDICINE & REHABILITATION

## 2021-07-22 PROCEDURE — 99214 OFFICE O/P EST MOD 30 MIN: CPT | Performed by: PHYSICAL MEDICINE & REHABILITATION

## 2021-07-22 RX ORDER — BACLOFEN 10 MG/1
TABLET ORAL
Qty: 60 TABLET | Refills: 3 | Status: SHIPPED | OUTPATIENT
Start: 2021-07-22

## 2021-07-22 RX ORDER — GABAPENTIN 100 MG/1
100 CAPSULE ORAL NIGHTLY
Qty: 90 CAPSULE | Refills: 3 | Status: SHIPPED | OUTPATIENT
Start: 2021-07-22 | End: 2022-06-30

## 2021-07-22 ASSESSMENT — ENCOUNTER SYMPTOMS
BACK PAIN: 1
ABDOMINAL PAIN: 0
VISUAL CHANGE: 1
NAUSEA: 0
SHORTNESS OF BREATH: 0
VOMITING: 0
BOWEL INCONTINENCE: 0

## 2021-07-22 NOTE — PROGRESS NOTES
TELEHEALTH EVALUATION -- Audio/Visual (During [facility]-07 public health emergency)    Due to COVID 19 outbreak, patient's office visit was converted to a virtual visit. Patient was contacted and agreed to proceed with a virtual visit via  [email or web address] me   The risks and benefits of converting to a virtual visit were discussed in light of the current infectious disease epidemic. Patient also understood that insurance coverage and co-pays are up to their individual insurance plans. Subjective       This patient has requested an audio/video evaluation for the following concern(s):    HPI:    Back Pain  Leg Pain (Bilateral)  Medication Refill (Percocet, Vit D refill today )  Wants to trial Neurontin and Baclofen again to limit Percocet dosing. 28-year-old male here again to follow-up for pain due to severe progressive MS unfortunately has degenerative joint and back pain in the mid and low back as well as neuropathic pain in his hands and feet. I have okayed him to transition to 3 a day of the Percocet and that will be his maximal dose he is tried baclofen without help Topamax without help injections without help he is tried Neurontin and it makes him feel little loopy we discussed taking possible little lower dose only at night. Back Pain  This is a chronic problem. The current episode started more than 1 year ago. The problem occurs constantly. The problem is unchanged. The pain is present in the gluteal and lumbar spine. The quality of the pain is described as aching. The pain is at a severity of 7/10. The symptoms are aggravated by lying down, position and coughing. Stiffness is present in the morning. Associated symptoms include bladder incontinence, leg pain, numbness, paresis, paresthesias, tingling and weakness. Pertinent negatives include no abdominal pain, bowel incontinence, chest pain, fever, headaches, pelvic pain, perianal numbness or weight loss.  Risk factors include history of steroid use, lack of exercise and sedentary lifestyle. He has tried NSAIDs, walking, ice, muscle relaxant, chiropractic manipulation, heat, home exercises, analgesics and bed rest for the symptoms. The treatment provided moderate relief. Leg Pain   The incident occurred more than 1 week ago. There was no injury mechanism. The quality of the pain is described as aching. The pain is at a severity of 5/10. The pain is mild. The pain has been improving since onset. Associated symptoms include muscle weakness, numbness and tingling. He reports no foreign bodies present. The symptoms are aggravated by movement, palpation and weight bearing. He has tried rest, heat, elevation, acetaminophen, immobilization, ice, non-weight bearing and NSAIDs for the symptoms. The treatment provided moderate relief. Neurologic Problem  The patient's primary symptoms include an altered mental status, clumsiness, focal sensory loss, focal weakness, a loss of balance, a visual change and weakness. The patient's pertinent negatives include no memory loss, near-syncope or slurred speech. This is a chronic problem. The current episode started more than 1 year ago. The neurological problem developed insidiously. The problem has been gradually worsening since onset. There was no focality noted. Associated symptoms include back pain, bladder incontinence, confusion, dizziness and fatigue. Pertinent negatives include no abdominal pain, auditory change, aura, bowel incontinence, chest pain, diaphoresis, fever, headaches, light-headedness, nausea, neck pain, palpitations, shortness of breath or vomiting. Past treatments include walking, medication and acetaminophen. The treatment provided moderate relief. His past medical history is significant for mood changes. There is no history of a bleeding disorder, a clotting disorder, a CVA, dementia, head trauma, liver disease or seizures.              Past Medical History:   Diagnosis Date    Chronic bilateral low back pain with sciatica 12/10/2019    Mild L4-5 degenerative disc disease and minimal facet degenerative  changes at L5-S1.  Otherwise normal lumbosacral spine. Mild remote benign lower thoracic compression fractures and mild to  moderate thoracic degenerative disc disease.  Erectile dysfunction     ULISSES virus antibody positive 06/10/2021    [facility]    Multiple sclerosis ([address] 12/10/2019    Sees [facility] Date of onset: 2006-Left optic neuritis Date of diagnosis of MS: 2006-Diagnosed by Dr. Jony Galindo course at onset: Relapsing-Remitting Current disease course: Relapsing-Remitting Previous disease therapies:  IVMP 2006 Copaxone-2006-2007 (stopped due to expense) IVMP 2008 (optic neuritis on right) IVMP -2015 (Left hand weaknes    OAB (overactive bladder) 06/15/2018    Spasticity 09/15/2015       History reviewed. No pertinent surgical history. Social History     Socioeconomic History    Marital status: Single     Spouse name: None    Number of children: None    Years of education: 15    Highest education level: Some college, no degree   Occupational History    Occupation: disabled dt MS   Tobacco Use    Smoking status: Former Smoker     Packs/day: 1.00     Quit date: 2021     Years since quittin.1    Smokeless tobacco: Never Used   Vaping Use    Vaping Use: Every day    Substances: Nicotine   Substance and Sexual Activity    Alcohol use: Not Currently    Drug use: Never    Sexual activity: None   Other Topics Concern    None   Social History Narrative    Social/Functional History     on SSDI for MS    Lives in [facility] with his twin brother and long term friend-Madan. He still pays most the bills. He was taken advantage of by the situation. He grew up in [employer] but is no longer [employer] and is agnostic if not in [place].    Still able to drive-but can't afford a car.     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 pre-law before optic neuritis--made it impossible for him to continue. Social Determinants of Health     Financial Resource Strain:     Difficulty of Paying Living Expenses:    Food Insecurity:     Worried About Running Out of Food in the Last Year:     [address] in the Last Year:    Transportation Needs:     Lack of Transportation (Medical):  Lack of Transportation (Non-Medical):    Physical Activity:     Days of Exercise per Week:     Minutes of Exercise per Session:    Stress:     Feeling of Stress :    Social Connections:     Frequency of Communication with Friends and Family:     Frequency of Social Gatherings with Friends and Family:     Attends [employer] Services:     Active Member of Clubs or Organizations:     Attends Club or Organization Meetings:     Marital Status:    Intimate Partner Violence:     Fear of Current or Ex-Partner:     Emotionally Abused:     Physically Abused:     Sexually Abused:        History reviewed. No pertinent family history. Allergies   Allergen Reactions    Prednisone Other (See Comments)     Cant have dt Jarrett Davila study he is in with [facility]       Review of Systems   Constitutional: Positive for fatigue. Negative for diaphoresis, fever and weight loss. Respiratory: Negative for shortness of breath. Cardiovascular: Negative for chest pain, palpitations and near-syncope. Gastrointestinal: Negative for abdominal pain, bowel incontinence, nausea and vomiting. Genitourinary: Positive for bladder incontinence. Negative for pelvic pain. Musculoskeletal: Positive for back pain. Negative for neck pain. Neurological: Positive for dizziness, tingling, focal weakness, weakness, numbness, paresthesias and loss of balance. Negative for light-headedness and headaches. Psychiatric/Behavioral: Positive for confusion. Negative for memory loss.        Objective    There were no vitals Chronic bilateral low back pain with sciatica 12/10/2019    Mild L4-5 degenerative disc disease and minimal facet degenerative  changes at L5-S1.  Otherwise normal lumbosacral spine. Mild remote benign lower thoracic compression fractures and mild to  moderate thoracic degenerative disc disease.  Erectile dysfunction     ULISSES virus antibody positive 06/10/2021    [facility]    Multiple sclerosis ([address] 12/10/2019    Sees [facility] Date of onset: August 2006-Left optic neuritis Date of diagnosis of MS: September 2006-Diagnosed by Dr. Tess Vega course at onset: Relapsing-Remitting Current disease course: Relapsing-Remitting Previous disease therapies:  IVMP August 2006 Copaxone-September 2006-January 2007 (stopped due to expense) IVMP July 2008 (optic neuritis on right) IVMP November 6-8, 2015 (Left hand weaknes    OAB (overactive bladder) 06/15/2018    Spasticity 09/15/2015           Given their medication, chronic pain and lifestyle and medications they are at risk for :    Falls, constipation, addiction, toxicity due to controlled/opiate medications  Loss of livelyhood due to severe pain, debility, weight gain and  vitamin D deficiency    The patient was educated regarding proper diet, fitness routine, and regulatory restrictions concerning pain medications. Previous notes, comprehensive past medical, surgical, family history, and diagnostics were reviewed. Patient education and councelling were provided regarding off label use,treatment options and medication and injection risks. Overall greater than 25 minutes spent in direct and indirect patient care. Current and old OARRS ([place] Automated Prescription Reporting System) records reviewed, all refills reviewed since last visit,  Behavioral agreement/[facility] regulations   and Toxicology screen was reviewed with patient and is up to date. There are no current red flags.    They are making good progress regarding pain relief, they are performing at a functional level regarding activities of daily living, family interactions and psychological functioning, they're not having any adverse effects or side effects from the current medications, and I see no findings of aberrant drug taking or addiction related behaviors. The patient is aware that they have a chronic pain condition and they may require opiates dosing for life. All efforts will be made to wean to the lowest effective dose. Other therapies for pain have not been effective including nonopiate medications. Injections and exercises are only partially effective. A Rx for Narcan was offered to help prevent accidental overdose. RX Monitoring 5/27/2020   Periodic Controlled Substance Monitoring Possible medication side effects, risk of tolerance/dependence & alternative treatments discussed. ;No signs of potential drug abuse or diversion identified. ;Assessed functional status. ;Obtaining appropriate analgesic effect of treatment. Chronic Pain > 50 MEDD Re-evaluated the status of the patient's underlying condition causing pain. ;Considered consultation with a specialist.;Obtained or confirmed \"Consent for Opioid Use\" on file. Patient is currently taking:       I am having Rogerio Contreras. Jayna Mas \"[place]\" maintain his vitamin D, oxyCODONE-acetaminophen, and Ocrelizumab (OCREVUS IV). I also recommend the following Medications:    No orders of the defined types were placed in this encounter. Percocet and Vit D       -which helps with pain and function. Otherwise, continue the current pain medications that I have prescibed. Radiologic:   Old films reviewed,     I discussed results with patients. see Follow up plans below  For any new studies. Care Everywhere Updates:  requested and reviewed. [facility] Infusion  ocrelizumab (OCREVUS) in NaCl 0.9% Vial-Mate 300 mg 250 mL    300 mg, INTRAVENOUS, ONCE, 1 dose, On u 7/15/21 at 0800, Initial infusion.  Start infusion at 30 mL/hr, Increase by 30 mL/hr every 30 minutes. Maximum rate = 180 mL/hr APPROXIMATE TOTAL VOLUME: 285 mL For 300 mg doses, administer one 300 mg/250 mL bag. For 600 mg doses, administer two 300 mg/250 mL bags. Administer with 0.2 micron filter. Refrigerate - Protect From Light.    No new issues noted. Electrodiagnostic:  Previous studies requested,  He recently had EMG studies which showed the multiple sclerosis and significant slowing at the carpal tunnel consistent with carpal tunnel syndrome and or the MS and low amplitudes in the lower extremity consistent with the MS and L5-S1 radiculopathy possibly overlying the MS. I discussed results with patient. See follow-up plans for new studies. Additional history obtained from independent sourcing including patient's family and caregivers. Labs:  Previous labs reviewed     No results found for: NA, K, CL, CO2, BUN, CREATININE, CALCIUM, LABALBU, BILITOT, ALKPHOS, AST, ALT  No results found for: WBC, RBC, HGB, HCT, MCV, MCH, MCHC, RDW, PLT, MPV    Lab Results   Component Value Date    DSCOMMENT See Note 12/10/2019       Lab Results   Component Value Date    BUPREN Negative 12/10/2019       No results found for: METPHEN, PHENTERMINE, BENZOYL, ALPRAZ, ALPHAOHALPRA, CLONAZEPAM, 7AMINOCLONAZ, DIAZEP, SAWYER, OXAZ, TEMAZ, LORAZEPAM, MIDAZOLAM, ZOLPIDEM, GRAHAM, ETG, MARIJMET, PCP, PAINMGTDRUGP, EERPAINMGTPA, LABCREA      , I discussed results with patient. See follow-up plans for new studies. Therapies:  HEP-gentle stretching and relaxation techniques-demonstrated with patient-they are to do them twice a day. They are also advised to make the following lifestyle changes:  Goals      SOAPP-R      12/10/19 score: 17- moderate risk  01/02/2020 score: 16 - moderate risk   1/22/20 score: 15- moderarte risk  2/28/20 score: 14- moderate risk    5- score - 12 - moderate risk             Injections or Epidurals:  Injection options were discussed.   Monthly trigger point injections add vitamin B12 when able. Patient gave verbal consent to ordered injections. See follow-up plans for planned injections. Supplements:  Vitamin D with increased dosing during the rainy months, add co-Q10 for heart health. Education was given on:   Dietary and Fitness--daily stretches and low carb diet-in chair Yoga when possible      Note controlled medication/opiate drug therapy requires intensive monitoring for toxicity and addiction. Follow up with Primary Care Physician regarding their general medical needs. Stressed the importance of following up with PCP and specialists for his/her chronic diseases, health, CV, and cancer screening and continued care. Will follow disease activity/progression and adjust therapeutic regimen to disease activity and severity. Discussed medication dosage, usage, goals of therapy, and side effects. Available test results were reviewed -Discussed findings, impression and plan with patient. An additional 5 minutes were spent outside of the patient visit to review records. Additional time spent with the patient to discuss their questions. Additional time spent with the patient devoted to discussing treatment strategy, planning, and implementation generalized musculoskeletal health plan. Patient understands above plan; questions asked and answered. Patient agrees to plan as noted above. F/U in 2-3months  At least 50% of the visit was involved in the discussion of the options for treatment. We discussed exercises, medication, interventional therapies and surgery. Healthy life style is essential with patient hard work to achieve the wellness.  In addition; discussion with the patient and/or family about any of the diagnostic results, impressions and/or recommended diagnostic studies, prognosis, risks and benefits of treatment options, instructions for treatment and/or follow-up, importance of compliance with chosen treatment options, risk-factor reduction, and patient/family education. They are to follow up in 2-2 1/2 months to review medication, efficacy of injections, pill counts, OARRS check, SOAPPR assessment, review diagnostics, to review previous and future treatment plans and assess appropriateness for continued therapy. New Diagnostics  No orders of the defined types were placed in this encounter. Follow-up in 2-1/2 to 3 months for guarding the efficacy of current plan and future treatment. An  electronic signature was used to authenticate this note. -Dr Chaya Wong, DO       With MA assistance from:   Phoenix Abel MA     19}    Pursuant to the emergency declaration under the [address] waiver authority and the [employer] Act, this Virtual  Visit was conducted, with patient's consent, to reduce the patient's risk of exposure to COVID-19 and provide continuity of care for an established patient. Services were provided through a video synchronous discussion virtually to substitute for in-person clinic visit.

## 2021-07-27 DIAGNOSIS — M54.41 CHRONIC BILATERAL LOW BACK PAIN WITH BILATERAL SCIATICA: ICD-10-CM

## 2021-07-27 DIAGNOSIS — M54.6 CHRONIC BILATERAL THORACIC BACK PAIN: ICD-10-CM

## 2021-07-27 DIAGNOSIS — Z79.899 HIGH RISK MEDICATION USE: ICD-10-CM

## 2021-07-27 DIAGNOSIS — G89.29 CHRONIC BILATERAL LOW BACK PAIN WITH BILATERAL SCIATICA: ICD-10-CM

## 2021-07-27 DIAGNOSIS — F32.1 CURRENT MODERATE EPISODE OF MAJOR DEPRESSIVE DISORDER, UNSPECIFIED WHETHER RECURRENT (HCC): ICD-10-CM

## 2021-07-27 DIAGNOSIS — M25.511 CHRONIC PAIN OF BOTH SHOULDERS: ICD-10-CM

## 2021-07-27 DIAGNOSIS — M54.42 CHRONIC BILATERAL LOW BACK PAIN WITH BILATERAL SCIATICA: ICD-10-CM

## 2021-07-27 DIAGNOSIS — G89.29 CHRONIC PAIN OF BOTH SHOULDERS: ICD-10-CM

## 2021-07-27 DIAGNOSIS — G89.29 CHRONIC BILATERAL THORACIC BACK PAIN: ICD-10-CM

## 2021-07-27 DIAGNOSIS — G35 MULTIPLE SCLEROSIS (HCC): ICD-10-CM

## 2021-07-27 DIAGNOSIS — M25.512 CHRONIC PAIN OF BOTH SHOULDERS: ICD-10-CM

## 2021-07-27 RX ORDER — OXYCODONE AND ACETAMINOPHEN 7.5; 325 MG/1; MG/1
1 TABLET ORAL EVERY 8 HOURS PRN
Qty: 90 TABLET | Refills: 0 | Status: SHIPPED | OUTPATIENT
Start: 2021-08-05 | End: 2021-09-02 | Stop reason: SDUPTHER

## 2021-07-27 RX ORDER — BACLOFEN 10 MG/1
TABLET ORAL
Qty: 60 TABLET | Refills: 3 | Status: SHIPPED | OUTPATIENT
Start: 2021-07-27 | End: 2022-06-30

## 2021-09-02 DIAGNOSIS — F32.1 CURRENT MODERATE EPISODE OF MAJOR DEPRESSIVE DISORDER, UNSPECIFIED WHETHER RECURRENT (HCC): ICD-10-CM

## 2021-09-02 DIAGNOSIS — Z79.899 HIGH RISK MEDICATION USE: ICD-10-CM

## 2021-09-02 DIAGNOSIS — M25.511 CHRONIC PAIN OF BOTH SHOULDERS: ICD-10-CM

## 2021-09-02 DIAGNOSIS — M25.512 CHRONIC PAIN OF BOTH SHOULDERS: ICD-10-CM

## 2021-09-02 DIAGNOSIS — G89.29 CHRONIC PAIN OF BOTH SHOULDERS: ICD-10-CM

## 2021-09-02 DIAGNOSIS — G89.29 CHRONIC BILATERAL LOW BACK PAIN WITH BILATERAL SCIATICA: ICD-10-CM

## 2021-09-02 DIAGNOSIS — M54.6 CHRONIC BILATERAL THORACIC BACK PAIN: ICD-10-CM

## 2021-09-02 DIAGNOSIS — G35 MULTIPLE SCLEROSIS (HCC): ICD-10-CM

## 2021-09-02 DIAGNOSIS — M54.42 CHRONIC BILATERAL LOW BACK PAIN WITH BILATERAL SCIATICA: ICD-10-CM

## 2021-09-02 DIAGNOSIS — M54.41 CHRONIC BILATERAL LOW BACK PAIN WITH BILATERAL SCIATICA: ICD-10-CM

## 2021-09-02 DIAGNOSIS — G89.29 CHRONIC BILATERAL THORACIC BACK PAIN: ICD-10-CM

## 2021-09-02 RX ORDER — OXYCODONE AND ACETAMINOPHEN 7.5; 325 MG/1; MG/1
1 TABLET ORAL EVERY 8 HOURS PRN
Qty: 90 TABLET | Refills: 0 | Status: SHIPPED | OUTPATIENT
Start: 2021-09-03 | End: 2021-09-14

## 2021-09-14 ENCOUNTER — VIRTUAL VISIT (OUTPATIENT)
Dept: PHYSICAL MEDICINE AND REHAB | Age: 36
End: 2021-09-14
Payer: MEDICARE

## 2021-09-14 DIAGNOSIS — M25.511 CHRONIC PAIN OF BOTH SHOULDERS: ICD-10-CM

## 2021-09-14 DIAGNOSIS — G89.29 CHRONIC BILATERAL LOW BACK PAIN WITH SCIATICA, SCIATICA LATERALITY UNSPECIFIED: ICD-10-CM

## 2021-09-14 DIAGNOSIS — G35 MULTIPLE SCLEROSIS (HCC): ICD-10-CM

## 2021-09-14 DIAGNOSIS — G57.93 NEUROPATHIC PAIN OF BOTH LEGS: Primary | ICD-10-CM

## 2021-09-14 DIAGNOSIS — G89.29 CHRONIC BILATERAL THORACIC BACK PAIN: ICD-10-CM

## 2021-09-14 DIAGNOSIS — F17.200 SMOKER: ICD-10-CM

## 2021-09-14 DIAGNOSIS — G89.29 CHRONIC PAIN OF BOTH SHOULDERS: ICD-10-CM

## 2021-09-14 DIAGNOSIS — M54.40 CHRONIC BILATERAL LOW BACK PAIN WITH SCIATICA, SCIATICA LATERALITY UNSPECIFIED: ICD-10-CM

## 2021-09-14 DIAGNOSIS — Z79.899 HIGH RISK MEDICATION USE: ICD-10-CM

## 2021-09-14 DIAGNOSIS — E55.9 VITAMIN D DEFICIENCY: ICD-10-CM

## 2021-09-14 DIAGNOSIS — M54.6 CHRONIC BILATERAL THORACIC BACK PAIN: ICD-10-CM

## 2021-09-14 DIAGNOSIS — M25.512 CHRONIC PAIN OF BOTH SHOULDERS: ICD-10-CM

## 2021-09-14 PROCEDURE — G8421 BMI NOT CALCULATED: HCPCS | Performed by: PHYSICAL MEDICINE & REHABILITATION

## 2021-09-14 PROCEDURE — 1036F TOBACCO NON-USER: CPT | Performed by: PHYSICAL MEDICINE & REHABILITATION

## 2021-09-14 PROCEDURE — G8427 DOCREV CUR MEDS BY ELIG CLIN: HCPCS | Performed by: PHYSICAL MEDICINE & REHABILITATION

## 2021-09-14 PROCEDURE — 99214 OFFICE O/P EST MOD 30 MIN: CPT | Performed by: PHYSICAL MEDICINE & REHABILITATION

## 2021-09-14 RX ORDER — OXYCODONE 9 MG/1
9 CAPSULE, EXTENDED RELEASE ORAL
Qty: 100 EACH | Refills: 0 | Status: SHIPPED | OUTPATIENT
Start: 2021-09-14 | End: 2021-10-15 | Stop reason: SDUPTHER

## 2021-09-14 ASSESSMENT — ENCOUNTER SYMPTOMS
DIARRHEA: 0
BACK PAIN: 1
COUGH: 0
CONSTIPATION: 1
SORE THROAT: 0
NAUSEA: 0
PHOTOPHOBIA: 0
WHEEZING: 0
EYE PAIN: 0
EYE REDNESS: 0
BLOOD IN STOOL: 0
BOWEL INCONTINENCE: 0
STRIDOR: 0
VOMITING: 0
SHORTNESS OF BREATH: 0
VISUAL CHANGE: 1
ABDOMINAL PAIN: 0

## 2021-09-14 NOTE — PROGRESS NOTES
TELEHEALTH EVALUATION -- Audio/Visual (During VHSWG-09 public health emergency)    Due to COVID 19 outbreak, patient's office visit was converted to a virtual visit. Patient was contacted and agreed to proceed with a virtual visit via  [email or web address] me   The risks and benefits of converting to a virtual visit were discussed in light of the current infectious disease epidemic. Patient also understood that insurance coverage and co-pays are up to their individual insurance plans. Subjective       This patient has requested an audio/video evaluation for the following concern(s):    HPI:    Back Pain-off Neurontin dt side effects  Leg Pain (Bilateral) Percocet not lasting will trial OxyER 9 mg  Medication Refill (Percocet, Baclofen, Vit D refill today)  Pain (3- With medication (Back/Leg)        61-year-old male here again to follow-up for pain due to severe progressive MS unfortunately has degenerative joint and back pain in the mid and low back as well as neuropathic pain in his hands and feet. I have okayed him to transition to 3 a day of the OxyER as the pERCOCET IS NOT LASTING. Back Pain  This is a chronic problem. The current episode started more than 1 year ago. The problem occurs constantly. The problem is unchanged. The pain is present in the gluteal and lumbar spine. The quality of the pain is described as aching. The pain is at a severity of 7/10. The symptoms are aggravated by lying down, position and coughing. Stiffness is present in the morning. Associated symptoms include bladder incontinence, leg pain, numbness, paresis, paresthesias, tingling and weakness. Pertinent negatives include no abdominal pain, bowel incontinence, chest pain, dysuria, fever, headaches, pelvic pain, perianal numbness or weight loss. Risk factors include history of steroid use, lack of exercise and sedentary lifestyle.  He has tried NSAIDs, walking, ice, muscle relaxant, chiropractic manipulation, heat, home exercises, analgesics and bed rest for the symptoms. The treatment provided moderate relief. Leg Pain   The incident occurred more than 1 week ago. There was no injury mechanism. The quality of the pain is described as aching. The pain is at a severity of 5/10. The pain is mild. The pain has been improving since onset. Associated symptoms include muscle weakness, numbness and tingling. He reports no foreign bodies present. The symptoms are aggravated by movement, palpation and weight bearing. He has tried rest, heat, elevation, acetaminophen, immobilization, ice, non-weight bearing and NSAIDs for the symptoms. The treatment provided moderate relief. Neurologic Problem  The patient's primary symptoms include an altered mental status, clumsiness, focal sensory loss, focal weakness, a loss of balance, a visual change and weakness. The patient's pertinent negatives include no memory loss, near-syncope or slurred speech. This is a chronic problem. The current episode started more than 1 year ago. The neurological problem developed insidiously. The problem has been gradually worsening since onset. There was no focality noted. Associated symptoms include back pain, bladder incontinence, confusion, dizziness and fatigue. Pertinent negatives include no abdominal pain, auditory change, aura, bowel incontinence, chest pain, diaphoresis, fever, headaches, light-headedness, nausea, neck pain, palpitations, shortness of breath or vomiting. Past treatments include walking, medication and acetaminophen. The treatment provided moderate relief. His past medical history is significant for mood changes. There is no history of a bleeding disorder, a clotting disorder, a CVA, dementia, head trauma, liver disease or seizures.              Past Medical History:   Diagnosis Date    Chronic bilateral low back pain with sciatica 12/10/2019    Mild L4-5 degenerative disc disease and minimal facet degenerative  changes at L5-S1.  Otherwise normal Social Determinants of Health     Financial Resource Strain:     Difficulty of Paying Living Expenses:    Food Insecurity:     Worried About Running Out of Food in the Last Year:     [address] in the Last Year:    Transportation Needs:     Lack of Transportation (Medical):  Lack of Transportation (Non-Medical):    Physical Activity:     Days of Exercise per Week:     Minutes of Exercise per Session:    Stress:     Feeling of Stress :    Social Connections:     Frequency of Communication with Friends and Family:     Frequency of Social Gatherings with Friends and Family:     Attends Sabianism Services:     Active Member of Clubs or Organizations:     Attends Club or Organization Meetings:     Marital Status:    Intimate Partner Violence:     Fear of Current or Ex-Partner:     Emotionally Abused:     Physically Abused:     Sexually Abused:        History reviewed. No pertinent family history. Allergies   Allergen Reactions    Prednisone Other (See Comments)     Cant have dt Caleen Schilder study he is in with [facility]       Review of Systems   Constitutional: Positive for activity change and fatigue. Negative for chills, diaphoresis, fever and weight loss. HENT: Negative for congestion, ear discharge, ear pain, hearing loss, nosebleeds, sore throat and tinnitus. Eyes: Negative for photophobia, pain and redness. Respiratory: Negative for cough, shortness of breath, wheezing and stridor. Shortness of breath on exertion   Cardiovascular: Negative for chest pain, palpitations, leg swelling and near-syncope. Gastrointestinal: Positive for constipation. Negative for abdominal pain, blood in stool, bowel incontinence, diarrhea, nausea and vomiting. Endocrine: Negative for polydipsia. Genitourinary: Positive for bladder incontinence. Negative for dysuria, flank pain, frequency, hematuria, pelvic pain and urgency. Musculoskeletal: Positive for back pain, gait problem and myalgias. Negative for neck pain. Skin: Negative for rash. Allergic/Immunologic: Positive for immunocompromised state. Negative for environmental allergies. Neurological: Positive for dizziness, tingling, focal weakness, weakness, numbness, paresthesias and loss of balance. Negative for tremors, seizures, light-headedness and headaches. Hematological: Does not bruise/bleed easily. Psychiatric/Behavioral: Positive for confusion. Negative for hallucinations, memory loss and suicidal ideas. The patient is not nervous/anxious. Objective    There were no vitals filed for this visit. No data recorded            PHYSICAL EXAMINATION:  [ INSTRUCTIONS:  \"[x]\" Indicates a positive item  \"[]\" Indicates a negative item  -- DELETE ALL ITEMS NOT EXAMINED]    [x] Alert  [x] Oriented to person/place/time      [x] No apparent distress  [x] Not toxic appearing    [x] Face complexion normal appearing [x] Sclera clear  [x] Lips are not cyanotic      [x] Breathing appears normal  [] Appears tachypneic      [] Rash on visible skin    [x] Cranial Nerves II-XII grossly intact    [x] Motor grossly intact in visible upper extremities, including stiff but intact range of motion in cervical, upper extremity and thoracic  [x] Motor grossly intact in visible lower extremities, including normal range of motion in the hips and knees for stiffness in the lumbar spine    [x] Normal Mood  [x] Not anxious appearing    [x] Not depressed appearing  [x] Not confused appearing      [x]  Good short term memory  [x]  Good long term memory    [x]  Able to follow 2-3 step commands    Due to this being a TeleHealth encounter, evaluation of the following organ systems is limited: Vitals/Constitutional/EENT/Resp/CV/GI//MS/Neuro/Skin/Heme-Lymph-Imm.     ASSESSMENT/PLAN:     After a thorough review and discussion of the previous medical records, patient comprehensive medical, surgical, and family and social history, Review of Systems, their Carlyon Mantle, their Screener and Opioid Assessment for Patients with Pain (SOAPP®-R), recent diagnostics, and symptomatic results to previous treatment, it is my impression that the patients is suffering with progressive and severe:     Diagnosis Orders   1. Neuropathic pain of both legs  oxyCODONE ER (XTAMPZA ER) 9 MG C12A   2. Multiple sclerosis (HCC)  oxyCODONE ER (XTAMPZA ER) 9 MG C12A   3. Chronic bilateral low back pain with sciatica, sciatica laterality unspecified  oxyCODONE ER (XTAMPZA ER) 9 MG C12A   4. Chronic bilateral thoracic back pain  oxyCODONE ER (XTAMPZA ER) 9 MG C12A   5. Chronic pain of both shoulders     6. Vitamin D deficiency     7. Smoker     8. High risk medication use  oxyCODONE ER (XTAMPZA ER) 9 MG C12A       I am also concerned by lifestyle and mood issues including:    Past Medical History:   Diagnosis Date    Chronic bilateral low back pain with sciatica 12/10/2019    Mild L4-5 degenerative disc disease and minimal facet degenerative  changes at L5-S1.  Otherwise normal lumbosacral spine. Mild remote benign lower thoracic compression fractures and mild to  moderate thoracic degenerative disc disease.     Erectile dysfunction     ULISSES virus antibody positive 06/10/2021    [facility]    Multiple sclerosis ([facility] [address] 12/10/2019    Sees [facility] Date of onset: August 2006-Left optic neuritis Date of diagnosis of MS: September 2006-Diagnosed by Dr. Judith Leigh course at onset: Relapsing-Remitting Current disease course: Relapsing-Remitting Previous disease therapies:  IVMP August 2006 Copaxone-September 2006-January 2007 (stopped due to expense) IVMP July 2008 (optic neuritis on right) IVMP November 6-8, 2015 (Left hand weaknes    OAB (overactive bladder) 06/15/2018    Spasticity 09/15/2015           Given their medication, chronic pain and lifestyle and medications they are at risk for :    Falls, constipation, addiction, toxicity due to controlled/opiate medications  Loss of livelyhood due to severe pain, debility, weight gain and  vitamin D deficiency    The patient was educated regarding proper diet, fitness routine, and regulatory restrictions concerning pain medications. Previous notes, comprehensive past medical, surgical, family history, and diagnostics were reviewed. Patient education and councelling were provided regarding off label use,treatment options and medication and injection risks. Overall greater than 25 minutes spent in direct and indirect patient care. Current and old OARRS ([facility] Automated Prescription Reporting System) records reviewed, all refills reviewed since last visit,  Behavioral agreement/[facility] regulations   and Toxicology screen was reviewed with patient and is up to date. There are no current red flags. They are making good progress regarding pain relief, they are performing at a functional level regarding activities of daily living, family interactions and psychological functioning, they're not having any adverse effects or side effects from the current medications, and I see no findings of aberrant drug taking or addiction related behaviors. The patient is aware that they have a chronic pain condition and they may require opiates dosing for life. All efforts will be made to wean to the lowest effective dose. Other therapies for pain have not been effective including nonopiate medications. Injections and exercises are only partially effective. A Rx for Narcan was offered to help prevent accidental overdose. RX Monitoring 8/16/2021   Periodic Controlled Substance Monitoring Possible medication side effects, risk of tolerance/dependence & alternative treatments discussed. ;No signs of potential drug abuse or diversion identified. ;Assessed functional status. ;Obtaining appropriate analgesic effect of treatment. Chronic Pain > 50 MEDD Re-evaluated the status of the patient's underlying condition causing pain. ;Considered consultation with a specialist.;Obtained or confirmed \"Consent for Opioid Use\" on file. Periodic Controlled Substance Monitoring: Possible medication side effects, risk of tolerance/dependence & alternative treatments discussed., No signs of potential drug abuse or diversion identified. , Assessed functional status., Obtaining appropriate analgesic effect of treatment. (Pal Arauz, DO)       Patient is currently taking:       I am having Jerry Mendenhall \"B[place]\" start on Xtampza ER. I am also having him maintain his vitamin D, Ocrelizumab (OCREVUS IV), baclofen, gabapentin, and baclofen. I also recommend the following Medications:    Orders Placed This Encounter   Medications    oxyCODONE ER (XTAMPZA ER) 9 MG C12A     Sig: Take 9 mg by mouth every 6-8 hours as needed (pain) for up to 30 days. Dispense:  100 each     Refill:  0     Reduce doses taken as pain becomes manageable        -which helps with pain and function. Otherwise, continue the current pain medications that I have prescibed. Radiologic:   Old films reviewed,     I discussed results with patients. see Follow up plans below  For any new studies. Care Everywhere Updates:  requested and reviewed. No new issues noted. Electrodiagnostic:  Previous studies requested,     I discussed results with patient. See follow-up plans for new studies. Additional history obtained from independent sourcing including patient's family and caregivers.     Labs:  Previous labs reviewed     No results found for: NA, K, CL, CO2, BUN, CREATININE, CALCIUM, LABALBU, BILITOT, ALKPHOS, AST, ALT  No results found for: WBC, RBC, HGB, HCT, MCV, MCH, MCHC, RDW, PLT, MPV    Lab Results   Component Value Date    DSCOMMENT See Note 12/10/2019       Lab Results   Component Value Date    BUPREN Negative 12/10/2019       No results found for: METPHEN, PHENTERMINE, BENZOYL, ALPRAZ, ALPHAOHALPRA, CLONAZEPAM, 7AMINOCLONAZ, DIAZEP, SAWYER, OXAZ, TEMAZ, LORAZEPAM, MIDAZOLAM, ZOLPIDEM, RHONDA STEVESNON MARIJMET, PCP, PAINMGTDRUGP, EERPAINMGTPA, LABCREA      , I discussed results with patient. See follow-up plans for new studies. Therapies:  HEP-gentle stretching and relaxation techniques-demonstrated with patient-they are to do them twice a day. They are also advised to make the following lifestyle changes:  Goals      SOAPP-R      12/10/19 score: 17- moderate risk  01/02/2020 score: 16 - moderate risk   1/22/20 score: 15- moderarte risk  2/28/20 score: 14- moderate risk    5- score - 12 - moderate risk             Injections or Epidurals:  Injection options were discussed. Monthly trigger point injections add vitamin B12 when able. Patient gave verbal consent to ordered injections. See follow-up plans for planned injections. Supplements:  Vitamin D with increased dosing during the rainy months, add co-Q10 for heart health. Education was given on:   Dietary and Fitness--daily stretches and low carb diet-in chair Yoga when possible      Note controlled medication/opiate drug therapy requires intensive monitoring for toxicity and addiction. Follow up with Primary Care Physician regarding their general medical needs. Stressed the importance of following up with PCP and specialists for his/her chronic diseases, health, CV, and cancer screening and continued care. Will follow disease activity/progression and adjust therapeutic regimen to disease activity and severity. Discussed medication dosage, usage, goals of therapy, and side effects. Available test results were reviewed -Discussed findings, impression and plan with patient. An additional 5 minutes were spent outside of the patient visit to review records. Additional time spent with the patient to discuss their questions. Additional time spent with the patient devoted to discussing treatment strategy, planning, and implementation generalized musculoskeletal health plan.     Patient understands above plan; questions asked and answered. Patient agrees to plan as noted above. F/U in 2-3months  At least 50% of the visit was involved in the discussion of the options for treatment. We discussed exercises, medication, interventional therapies and surgery. Healthy life style is essential with patient hard work to achieve the wellness. In addition; discussion with the patient and/or family about any of the diagnostic results, impressions and/or recommended diagnostic studies, prognosis, risks and benefits of treatment options, instructions for treatment and/or follow-up, importance of compliance with chosen treatment options, risk-factor reduction, and patient/family education. They are to follow up in 2-2 1/2 months to review medication, efficacy of injections, pill counts, OARRS check, SOAPPR assessment, review diagnostics, to review previous and future treatment plans and assess appropriateness for continued therapy. New Diagnostics  No orders of the defined types were placed in this encounter. Follow-up in 2-to 3 months for guarding the efficacy of current plan and future treatment. An  electronic signature was used to authenticate this note. -Dr Ani Johnson, DO       With MA assistance from:   Kelly Howell MA     19}    Pursuant to the emergency declaration under the [facility], [facility]5 waiver authority and the [employer] and [employer] Act, this Virtual  Visit was conducted, with patient's consent, to reduce the patient's risk of exposure to COVID-19 and provide continuity of care for an established patient. Services were provided through a video synchronous discussion virtually to substitute for in-person clinic visit.

## 2021-09-16 ENCOUNTER — CLINICAL DOCUMENTATION (OUTPATIENT)
Dept: PHYSICAL MEDICINE AND REHAB | Age: 36
End: 2021-09-16

## 2021-09-16 NOTE — PROGRESS NOTES
PA for [facility] ER 9 mg capsules completed by phone with [employer] PA department. Office notes from 07/22/21 and 09/14/21 faxed to [phone]. Pending approval.    Approval received 09/16/21 and is good until 12/31/21.

## 2021-10-15 DIAGNOSIS — G89.29 CHRONIC BILATERAL THORACIC BACK PAIN: ICD-10-CM

## 2021-10-15 DIAGNOSIS — G35 MULTIPLE SCLEROSIS (HCC): ICD-10-CM

## 2021-10-15 DIAGNOSIS — Z79.899 HIGH RISK MEDICATION USE: ICD-10-CM

## 2021-10-15 DIAGNOSIS — G89.29 CHRONIC BILATERAL LOW BACK PAIN WITH SCIATICA, SCIATICA LATERALITY UNSPECIFIED: ICD-10-CM

## 2021-10-15 DIAGNOSIS — M54.40 CHRONIC BILATERAL LOW BACK PAIN WITH SCIATICA, SCIATICA LATERALITY UNSPECIFIED: ICD-10-CM

## 2021-10-15 DIAGNOSIS — G57.93 NEUROPATHIC PAIN OF BOTH LEGS: ICD-10-CM

## 2021-10-15 DIAGNOSIS — M54.6 CHRONIC BILATERAL THORACIC BACK PAIN: ICD-10-CM

## 2021-10-15 RX ORDER — OXYCODONE 9 MG/1
9 CAPSULE, EXTENDED RELEASE ORAL
Qty: 100 EACH | Refills: 0 | Status: SHIPPED | OUTPATIENT
Start: 2021-10-15 | End: 2021-11-10 | Stop reason: SDUPTHER

## 2021-10-26 ENCOUNTER — VIRTUAL VISIT (OUTPATIENT)
Dept: PHYSICAL MEDICINE AND REHAB | Age: 36
End: 2021-10-26
Payer: MEDICARE

## 2021-10-26 DIAGNOSIS — G89.29 CHRONIC PAIN OF BOTH SHOULDERS: ICD-10-CM

## 2021-10-26 DIAGNOSIS — G57.93 NEUROPATHIC PAIN OF BOTH LEGS: Primary | ICD-10-CM

## 2021-10-26 DIAGNOSIS — M25.512 CHRONIC PAIN OF BOTH SHOULDERS: ICD-10-CM

## 2021-10-26 DIAGNOSIS — M25.511 CHRONIC PAIN OF BOTH SHOULDERS: ICD-10-CM

## 2021-10-26 DIAGNOSIS — G89.29 CHRONIC BILATERAL LOW BACK PAIN WITH SCIATICA, SCIATICA LATERALITY UNSPECIFIED: ICD-10-CM

## 2021-10-26 DIAGNOSIS — Z79.899 HIGH RISK MEDICATION USE: ICD-10-CM

## 2021-10-26 DIAGNOSIS — M54.40 CHRONIC BILATERAL LOW BACK PAIN WITH SCIATICA, SCIATICA LATERALITY UNSPECIFIED: ICD-10-CM

## 2021-10-26 DIAGNOSIS — G89.29 CHRONIC BILATERAL THORACIC BACK PAIN: ICD-10-CM

## 2021-10-26 DIAGNOSIS — G35 MULTIPLE SCLEROSIS (HCC): ICD-10-CM

## 2021-10-26 DIAGNOSIS — M54.6 CHRONIC BILATERAL THORACIC BACK PAIN: ICD-10-CM

## 2021-10-26 PROBLEM — G89.0 THALAMIC PAIN SYNDROME: Status: ACTIVE | Noted: 2021-10-26

## 2021-10-26 PROCEDURE — G8421 BMI NOT CALCULATED: HCPCS | Performed by: PHYSICAL MEDICINE & REHABILITATION

## 2021-10-26 PROCEDURE — G8427 DOCREV CUR MEDS BY ELIG CLIN: HCPCS | Performed by: PHYSICAL MEDICINE & REHABILITATION

## 2021-10-26 PROCEDURE — 1036F TOBACCO NON-USER: CPT | Performed by: PHYSICAL MEDICINE & REHABILITATION

## 2021-10-26 PROCEDURE — G8484 FLU IMMUNIZE NO ADMIN: HCPCS | Performed by: PHYSICAL MEDICINE & REHABILITATION

## 2021-10-26 PROCEDURE — 99214 OFFICE O/P EST MOD 30 MIN: CPT | Performed by: PHYSICAL MEDICINE & REHABILITATION

## 2021-10-26 ASSESSMENT — ENCOUNTER SYMPTOMS
COUGH: 0
ABDOMINAL PAIN: 0
BACK PAIN: 1
SORE THROAT: 0
EYE REDNESS: 0
VOMITING: 0
WHEEZING: 0
CONSTIPATION: 1
DIARRHEA: 0
NAUSEA: 0
PHOTOPHOBIA: 0
EYE PAIN: 0
STRIDOR: 0
BLOOD IN STOOL: 0
SHORTNESS OF BREATH: 0
BOWEL INCONTINENCE: 0
VISUAL CHANGE: 1

## 2021-10-26 NOTE — PROGRESS NOTES
TELEHEALTH EVALUATION -- Audio/Visual (During YKXTA-00 public health emergency)    Due to COVID 19 outbreak, patient's office visit was converted to a virtual visit. Patient was contacted and agreed to proceed with a virtual visit via  [email or web address]   The risks and benefits of converting to a virtual visit were discussed in light of the current infectious disease epidemic. Patient also understood that insurance coverage and co-pays are up to their individual insurance plans. Subjective       This patient has requested an audio/video evaluation for the following concern(s):    HPI:    Back Pain-->Leg Pain (Bilateral)  Medication Refill ([facility] [facility], Baclofen, Vit D refill today)  Pain (2- With medication (Back/Leg)        80-year-old male here again to follow-up for pain due to severe progressive MS unfortunately has degenerative joint and back pain in the mid and low back as well as neuropathic pain in his hands and feet. I have okayed him to transition to [place]. Back Pain  This is a chronic problem. The current episode started more than 1 year ago. The problem occurs constantly. The problem is unchanged. The pain is present in the gluteal and lumbar spine. The quality of the pain is described as aching. The pain is at a severity of 5/10. The symptoms are aggravated by lying down, position and coughing. Stiffness is present in the morning. Associated symptoms include bladder incontinence, leg pain, numbness, paresis, paresthesias, tingling and weakness. Pertinent negatives include no abdominal pain, bowel incontinence, chest pain, dysuria, fever, headaches, pelvic pain, perianal numbness or weight loss. Risk factors include history of steroid use, lack of exercise and sedentary lifestyle. He has tried NSAIDs, walking, ice, muscle relaxant, chiropractic manipulation, heat, home exercises, analgesics and bed rest for the symptoms. The treatment provided significant relief.    Leg Pain   The incident occurred more than 1 week ago. There was no injury mechanism. The quality of the pain is described as aching. The pain is at a severity of 5/10. The pain is mild. The pain has been improving since onset. Associated symptoms include muscle weakness, numbness and tingling. He reports no foreign bodies present. The symptoms are aggravated by movement, palpation and weight bearing. He has tried rest, heat, elevation, acetaminophen, immobilization, ice, non-weight bearing and NSAIDs for the symptoms. The treatment provided moderate relief. Neurologic Problem  The patient's primary symptoms include an altered mental status, clumsiness, focal sensory loss, focal weakness, a loss of balance, a visual change and weakness. The patient's pertinent negatives include no memory loss, near-syncope or slurred speech. This is a chronic problem. The current episode started more than 1 year ago. The neurological problem developed insidiously. The problem has been gradually worsening since onset. There was no focality noted. Associated symptoms include back pain, bladder incontinence, confusion, dizziness and fatigue. Pertinent negatives include no abdominal pain, auditory change, aura, bowel incontinence, chest pain, diaphoresis, fever, headaches, light-headedness, nausea, neck pain, palpitations, shortness of breath or vomiting. Past treatments include walking, medication and acetaminophen. The treatment provided moderate relief. His past medical history is significant for mood changes. There is no history of a bleeding disorder, a clotting disorder, a CVA, dementia, head trauma, liver disease or seizures. Past Medical History:   Diagnosis Date    Chronic bilateral low back pain with sciatica 12/10/2019    Mild L4-5 degenerative disc disease and minimal facet degenerative  changes at L5-S1.  Otherwise normal lumbosacral spine.   Mild remote benign lower thoracic compression fractures and mild to  moderate thoracic degenerative disc disease.  Erectile dysfunction     ULISSES virus antibody positive 06/10/2021    [facility]    Multiple sclerosis ([address] 12/10/2019    Sees [facility] Date of onset: 2006-Left optic neuritis Date of diagnosis of MS: 2006-Diagnosed by Dr. Lemuel Gitelman course at onset: Relapsing-Remitting Current disease course: Relapsing-Remitting Previous disease therapies:  IVMP 2006 Copaxone-2006-2007 (stopped due to expense) IVMP 2008 (optic neuritis on right) IVMP -2015 (Left hand weaknes    OAB (overactive bladder) 06/15/2018    Spasticity 09/15/2015       History reviewed. No pertinent surgical history. Social History     Socioeconomic History    Marital status: Single     Spouse name: None    Number of children: None    Years of education: 15    Highest education level: Some college, no degree   Occupational History    Occupation: disabled dt MS   Tobacco Use    Smoking status: Former Smoker     Packs/day: 1.00     Quit date: 2021     Years since quittin.4    Smokeless tobacco: Never Used   Vaping Use    Vaping Use: Every day    Substances: Nicotine   Substance and Sexual Activity    Alcohol use: Not Currently    Drug use: Never    Sexual activity: None   Other Topics Concern    None   Social History Narrative    Social/Functional History     on SSDI for MS    Lives in [place] with his twin brother and long term friend-Madan. He still pays most the bills. He was taken advantage of by the situation. He grew up in [employer] school but is no longer Confucianism and is agnostic if not in [place].    Still able to drive-but can't afford a car. 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      Social Determinants of Health     Financial Resource Strain:     Difficulty of Paying Living Expenses: Food Insecurity:     Worried About Running Out of Food in the Last Year:     [address] in the Last Year:    Transportation Needs:     Lack of Transportation (Medical):  Lack of Transportation (Non-Medical):    Physical Activity:     Days of Exercise per Week:     Minutes of Exercise per Session:    Stress:     Feeling of Stress :    Social Connections:     Frequency of Communication with Friends and Family:     Frequency of Social Gatherings with Friends and Family:     Attends Zoroastrian Services:     Active Member of Clubs or Organizations:     Attends Club or Organization Meetings:     Marital Status:    Intimate Partner Violence:     Fear of Current or Ex-Partner:     Emotionally Abused:     Physically Abused:     Sexually Abused:        History reviewed. No pertinent family history. Allergies   Allergen Reactions    Prednisone Other (See Comments)     Cant yoli Gale study he is in with [facility]       Review of Systems   Constitutional: Positive for activity change and fatigue. Negative for chills, diaphoresis, fever and weight loss. HENT: Negative for congestion, ear discharge, ear pain, hearing loss, nosebleeds, sore throat and tinnitus. Eyes: Negative for photophobia, pain and redness. Respiratory: Negative for cough, shortness of breath, wheezing and stridor. Shortness of breath on exertion   Cardiovascular: Negative for chest pain, palpitations, leg swelling and near-syncope. Gastrointestinal: Positive for constipation. Negative for abdominal pain, blood in stool, bowel incontinence, diarrhea, nausea and vomiting. Endocrine: Negative for polydipsia. Genitourinary: Positive for bladder incontinence. Negative for dysuria, flank pain, frequency, hematuria, pelvic pain and urgency. Musculoskeletal: Positive for back pain, gait problem and myalgias. Negative for neck pain. Skin: Negative for rash.    Allergic/Immunologic: Positive for immunocompromised state. Negative for environmental allergies. Neurological: Positive for dizziness, tingling, focal weakness, weakness, numbness, paresthesias and loss of balance. Negative for tremors, seizures, light-headedness and headaches. Hematological: Does not bruise/bleed easily. Psychiatric/Behavioral: Positive for confusion. Negative for hallucinations, memory loss and suicidal ideas. The patient is not nervous/anxious. Objective    There were no vitals filed for this visit. No data recorded            PHYSICAL EXAMINATION:  [ INSTRUCTIONS:  \"[x]\" Indicates a positive item  \"[]\" Indicates a negative item  -- DELETE ALL ITEMS NOT EXAMINED]    [x] Alert  [x] Oriented to person/place/time      [x] No apparent distress  [x] Not toxic appearing    [x] Face complexion normal appearing [x] Sclera clear  [x] Lips are not cyanotic      [x] Breathing appears normal  [] Appears tachypneic      [] Rash on visible skin    [x] Cranial Nerves II-XII grossly intact    [x] Motor grossly intact in visible upper extremities, including stiff but intact range of motion in cervical, upper extremity and thoracic  [x] Motor grossly intact in visible lower extremities, including normal range of motion in the hips and knees for stiffness in the lumbar spine    [x] Normal Mood  [x] Not anxious appearing    [x] Not depressed appearing  [x] Not confused appearing      [x]  Good short term memory  [x]  Good long term memory    [x]  Able to follow 2-3 step commands    Due to this being a TeleHealth encounter, evaluation of the following organ systems is limited: Vitals/Constitutional/EENT/Resp/CV/GI//MS/Neuro/Skin/Heme-Lymph-Imm.     ASSESSMENT/PLAN:     After a thorough review and discussion of the previous medical records, patient comprehensive medical, surgical, and family and social history, Review of Systems, their OARRS, their Screener and Opioid Assessment for Patients with Pain (SOAPP®-R), recent diagnostics, and symptomatic results to previous treatment, it is my impression that the patients is suffering with progressive and severe:     Diagnosis Orders   1. Neuropathic pain of both legs     2. High risk medication use     3. Chronic bilateral thoracic back pain     4. Chronic pain of both shoulders     5. Chronic bilateral low back pain with sciatica, sciatica laterality unspecified     6. Multiple sclerosis ([facility] [address]         I am also concerned by lifestyle and mood issues including:    Past Medical History:   Diagnosis Date    Chronic bilateral low back pain with sciatica 12/10/2019    Mild L4-5 degenerative disc disease and minimal facet degenerative  changes at L5-S1.  Otherwise normal lumbosacral spine. Mild remote benign lower thoracic compression fractures and mild to  moderate thoracic degenerative disc disease.  Erectile dysfunction     ULISSES virus antibody positive 06/10/2021    [facility]    Multiple sclerosis ([facility] [address] 12/10/2019    Sees [facility] Date of onset: August 2006-Left optic neuritis Date of diagnosis of MS: September 2006-Diagnosed by Dr. Sandra Marsh course at onset: Relapsing-Remitting Current disease course: Relapsing-Remitting Previous disease therapies:  IVMP August 2006 Copaxone-September 2006-January 2007 (stopped due to expense) IVMP July 2008 (optic neuritis on right) IVMP November 6-8, 2015 (Left hand weaknes    OAB (overactive bladder) 06/15/2018    Spasticity 09/15/2015           Given their medication, chronic pain and lifestyle and medications they are at risk for :    Falls, constipation, addiction, toxicity due to controlled/opiate medications  Loss of livelyhood due to severe pain, debility, weight gain and  vitamin D deficiency    The patient was educated regarding proper diet, fitness routine, and regulatory restrictions concerning pain medications. Previous notes, comprehensive past medical, surgical, family history, and diagnostics were reviewed.    Patient education and councelling were provided regarding off label use,treatment options and medication and injection risks. Overall greater than 25 minutes spent in direct and indirect patient care. Current and old OARRS ([place] Automated Prescription Reporting System) records reviewed, all refills reviewed since last visit,  Behavioral agreement/[place]AL[place] regulations   and Toxicology screen was reviewed with patient and is up to date. There are no current red flags. They are making good progress regarding pain relief, they are performing at a functional level regarding activities of daily living, family interactions and psychological functioning, they're not having any adverse effects or side effects from the current medications, and I see no findings of aberrant drug taking or addiction related behaviors. The patient is aware that they have a chronic pain condition and they may require opiates dosing for life. All efforts will be made to wean to the lowest effective dose. Other therapies for pain have not been effective including nonopiate medications. Injections and exercises are only partially effective. A Rx for Narcan was offered to help prevent accidental overdose. RX Monitoring 9/27/2021   Acute Pain Prescriptions Prescription exceeds daily limit for a specific reason. See comments or note. ;Not required given exclusionary diagnoses. ..;Severe pain not adequately treated with lower dose. Periodic Controlled Substance Monitoring Possible medication side effects, risk of tolerance/dependence & alternative treatments discussed. ;No signs of potential drug abuse or diversion identified. ;Assessed functional status. ;Obtaining appropriate analgesic effect of treatment. Chronic Pain > 50 MEDD Re-evaluated the status of the patient's underlying condition causing pain. ;Considered consultation with a specialist.;Obtained or confirmed \"Consent for Opioid Use\" on file.        Periodic Controlled Substance Monitoring: Possible medication side effects, risk of tolerance/dependence & alternative treatments discussed., No signs of potential drug abuse or diversion identified. , Assessed functional status., Obtaining appropriate analgesic effect of treatment. (Alycia Amezquita, DO)       Patient is currently taking:       I am having Akosua Razor. Katrina Allis \"[place]\" maintain his vitamin D, Ocrelizumab (OCREVUS IV), baclofen, gabapentin, and baclofen. I also recommend the following Medications:    No orders of the defined types were placed in this encounter. Xtampza  3-4 per day.     -which helps with pain and function. Otherwise, continue the current pain medications that I have prescibed. Radiologic:   Old films reviewed,     I discussed results with patients. see Follow up plans below  For any new studies. Care Everywhere Updates:  requested and reviewed. No new issues noted. Electrodiagnostic:  Previous studies requested,     I discussed results with patient. See follow-up plans for new studies. Additional history obtained from independent sourcing including patient's family and caregivers. Labs:  Previous labs reviewed     No results found for: NA, K, CL, CO2, BUN, CREATININE, CALCIUM, LABALBU, BILITOT, ALKPHOS, AST, ALT  No results found for: WBC, RBC, HGB, HCT, MCV, MCH, MCHC, RDW, PLT, MPV    Lab Results   Component Value Date    DSCOMMENT See Note 12/10/2019       Lab Results   Component Value Date    BUPREN Negative 12/10/2019       No results found for: METPHEN, PHENTERMINE, BENZOYL, ALPRAZ, ALPHAOHALPRA, CLONAZEPAM, 7AMINOCLONAZ, DIAZEP, SAWYER, OXAZ, TEMAZ, LORAZEPAM, MIDAZOLAM, ZOLPIDEM, GRAHAM, ETG, MARIJMET, PCP, PAINMGTDRUGP, EERPAINMGTPA, LABCREA      , I discussed results with patient. See follow-up plans for new studies. Therapies:  HEP-gentle stretching and relaxation techniques-demonstrated with patient-they are to do them twice a day.   They are also advised to make the following lifestyle changes:  Goals  SOAPP-R      12/10/19 score: 17- moderate risk  01/02/2020 score: 16 - moderate risk   1/22/20 score: 15- moderarte risk  2/28/20 score: 14- moderate risk    5- score - 12 - moderate risk             Injections or Epidurals:  Injection options were discussed. Monthly trigger point injections add vitamin B12 when able. Patient gave verbal consent to ordered injections. See follow-up plans for planned injections. Supplements:  Vitamin D with increased dosing during the rainy months, add co-Q10 for heart health. Education was given on:   Dietary and Fitness--daily stretches and low carb diet-in chair Yoga when possible      Note controlled medication/opiate drug therapy requires intensive monitoring for toxicity and addiction. Follow up with Primary Care Physician regarding their general medical needs. Stressed the importance of following up with PCP and specialists for his/her chronic diseases, health, CV, and cancer screening and continued care. Will follow disease activity/progression and adjust therapeutic regimen to disease activity and severity. Discussed medication dosage, usage, goals of therapy, and side effects. Available test results were reviewed -Discussed findings, impression and plan with patient. An additional  minutes were spent outside of the patient visit to review records. Additional time spent with the patient to discuss their questions. Additional time spent with the patient devoted to discussing treatment strategy, planning, and implementation generalized musculoskeletal health plan. Patient understands above plan; questions asked and answered. Patient agrees to plan as noted above. On this date  10/26/21 I have spent 7 minutes reviewing previous notes, test results and face to face (virtual) with the patient discussing the diagnosis and importance of compliance with the treatment plan as well as documenting on the day of the visit.       At least

## 2021-11-10 DIAGNOSIS — M54.6 CHRONIC BILATERAL THORACIC BACK PAIN: ICD-10-CM

## 2021-11-10 DIAGNOSIS — Z79.899 HIGH RISK MEDICATION USE: ICD-10-CM

## 2021-11-10 DIAGNOSIS — G89.29 CHRONIC BILATERAL LOW BACK PAIN WITH SCIATICA, SCIATICA LATERALITY UNSPECIFIED: ICD-10-CM

## 2021-11-10 DIAGNOSIS — G57.93 NEUROPATHIC PAIN OF BOTH LEGS: ICD-10-CM

## 2021-11-10 DIAGNOSIS — G89.29 CHRONIC BILATERAL THORACIC BACK PAIN: ICD-10-CM

## 2021-11-10 DIAGNOSIS — M54.40 CHRONIC BILATERAL LOW BACK PAIN WITH SCIATICA, SCIATICA LATERALITY UNSPECIFIED: ICD-10-CM

## 2021-11-10 DIAGNOSIS — G35 MULTIPLE SCLEROSIS (HCC): ICD-10-CM

## 2021-11-10 RX ORDER — OXYCODONE 9 MG/1
9 CAPSULE, EXTENDED RELEASE ORAL
Qty: 100 EACH | Refills: 0 | Status: SHIPPED | OUTPATIENT
Start: 2021-11-13 | End: 2021-12-10 | Stop reason: SDUPTHER

## 2021-12-10 DIAGNOSIS — Z79.899 HIGH RISK MEDICATION USE: ICD-10-CM

## 2021-12-10 DIAGNOSIS — M54.6 CHRONIC BILATERAL THORACIC BACK PAIN: ICD-10-CM

## 2021-12-10 DIAGNOSIS — G35 MULTIPLE SCLEROSIS (HCC): ICD-10-CM

## 2021-12-10 DIAGNOSIS — M54.40 CHRONIC BILATERAL LOW BACK PAIN WITH SCIATICA, SCIATICA LATERALITY UNSPECIFIED: ICD-10-CM

## 2021-12-10 DIAGNOSIS — G89.29 CHRONIC BILATERAL LOW BACK PAIN WITH SCIATICA, SCIATICA LATERALITY UNSPECIFIED: ICD-10-CM

## 2021-12-10 DIAGNOSIS — G57.93 NEUROPATHIC PAIN OF BOTH LEGS: ICD-10-CM

## 2021-12-10 DIAGNOSIS — G89.29 CHRONIC BILATERAL THORACIC BACK PAIN: ICD-10-CM

## 2021-12-10 RX ORDER — OXYCODONE 9 MG/1
9 CAPSULE, EXTENDED RELEASE ORAL
Qty: 100 EACH | Refills: 0 | Status: SHIPPED | OUTPATIENT
Start: 2021-12-12 | End: 2022-01-13 | Stop reason: SDUPTHER

## 2022-01-13 DIAGNOSIS — G89.29 CHRONIC BILATERAL THORACIC BACK PAIN: ICD-10-CM

## 2022-01-13 DIAGNOSIS — G57.93 NEUROPATHIC PAIN OF BOTH LEGS: ICD-10-CM

## 2022-01-13 DIAGNOSIS — M54.6 CHRONIC BILATERAL THORACIC BACK PAIN: ICD-10-CM

## 2022-01-13 DIAGNOSIS — Z79.899 HIGH RISK MEDICATION USE: ICD-10-CM

## 2022-01-13 DIAGNOSIS — G89.29 CHRONIC BILATERAL LOW BACK PAIN WITH SCIATICA, SCIATICA LATERALITY UNSPECIFIED: ICD-10-CM

## 2022-01-13 DIAGNOSIS — M54.40 CHRONIC BILATERAL LOW BACK PAIN WITH SCIATICA, SCIATICA LATERALITY UNSPECIFIED: ICD-10-CM

## 2022-01-13 DIAGNOSIS — G35 MULTIPLE SCLEROSIS (HCC): ICD-10-CM

## 2022-01-13 RX ORDER — OXYCODONE 9 MG/1
9 CAPSULE, EXTENDED RELEASE ORAL
Qty: 100 EACH | Refills: 0 | Status: SHIPPED | OUTPATIENT
Start: 2022-01-13 | End: 2022-01-20

## 2022-01-20 ENCOUNTER — VIRTUAL VISIT (OUTPATIENT)
Dept: PHYSICAL MEDICINE AND REHAB | Age: 37
End: 2022-01-20
Payer: MEDICARE

## 2022-01-20 DIAGNOSIS — M54.42 CHRONIC BILATERAL LOW BACK PAIN WITH BILATERAL SCIATICA: ICD-10-CM

## 2022-01-20 DIAGNOSIS — M54.6 CHRONIC BILATERAL THORACIC BACK PAIN: ICD-10-CM

## 2022-01-20 DIAGNOSIS — M25.511 CHRONIC PAIN OF BOTH SHOULDERS: ICD-10-CM

## 2022-01-20 DIAGNOSIS — Z79.899 HIGH RISK MEDICATION USE: ICD-10-CM

## 2022-01-20 DIAGNOSIS — G57.93 NEUROPATHIC PAIN OF BOTH LEGS: Primary | ICD-10-CM

## 2022-01-20 DIAGNOSIS — G89.29 CHRONIC BILATERAL LOW BACK PAIN WITH BILATERAL SCIATICA: ICD-10-CM

## 2022-01-20 DIAGNOSIS — G89.29 CHRONIC BILATERAL THORACIC BACK PAIN: ICD-10-CM

## 2022-01-20 DIAGNOSIS — G89.29 CHRONIC PAIN OF BOTH SHOULDERS: ICD-10-CM

## 2022-01-20 DIAGNOSIS — M54.41 CHRONIC BILATERAL LOW BACK PAIN WITH BILATERAL SCIATICA: ICD-10-CM

## 2022-01-20 DIAGNOSIS — G35 MULTIPLE SCLEROSIS (HCC): ICD-10-CM

## 2022-01-20 DIAGNOSIS — M25.512 CHRONIC PAIN OF BOTH SHOULDERS: ICD-10-CM

## 2022-01-20 PROCEDURE — G8427 DOCREV CUR MEDS BY ELIG CLIN: HCPCS | Performed by: PHYSICAL MEDICINE & REHABILITATION

## 2022-01-20 PROCEDURE — G8421 BMI NOT CALCULATED: HCPCS | Performed by: PHYSICAL MEDICINE & REHABILITATION

## 2022-01-20 PROCEDURE — 1036F TOBACCO NON-USER: CPT | Performed by: PHYSICAL MEDICINE & REHABILITATION

## 2022-01-20 PROCEDURE — G8484 FLU IMMUNIZE NO ADMIN: HCPCS | Performed by: PHYSICAL MEDICINE & REHABILITATION

## 2022-01-20 PROCEDURE — 99214 OFFICE O/P EST MOD 30 MIN: CPT | Performed by: PHYSICAL MEDICINE & REHABILITATION

## 2022-01-20 RX ORDER — OXYCODONE 13.5 MG/1
13.5 CAPSULE, EXTENDED RELEASE ORAL EVERY 6 HOURS PRN
Qty: 120 EACH | Refills: 0 | Status: SHIPPED | OUTPATIENT
Start: 2022-01-20 | End: 2022-01-31 | Stop reason: SDUPTHER

## 2022-01-20 ASSESSMENT — ENCOUNTER SYMPTOMS
BACK PAIN: 1
CONSTIPATION: 1
EYE PAIN: 0
WHEEZING: 0
STRIDOR: 0
BOWEL INCONTINENCE: 0
BLOOD IN STOOL: 0
COUGH: 0
DIARRHEA: 0
VOMITING: 0
ABDOMINAL PAIN: 0
PHOTOPHOBIA: 0
VISUAL CHANGE: 1
SHORTNESS OF BREATH: 0
EYE REDNESS: 0
SORE THROAT: 0
NAUSEA: 0

## 2022-01-20 NOTE — PROGRESS NOTES
TELEHEALTH EVALUATION -- Audio/Visual (During XTIBJ-91 public health emergency)    Due to COVID 19 outbreak, patient's office visit was converted to a virtual visit. Patient was contacted and agreed to proceed with a virtual visit via  [employer]. me   The risks and benefits of converting to a virtual visit were discussed in light of the current infectious disease epidemic. Patient also understood that insurance coverage and co-pays are up to their individual insurance plans. Subjective       This patient has requested an audio/video evaluation for the following concern(s):    HPI:     Back Pain--->Leg Pain (Bilateral)   Medication Refill (Xtampza, Baclofen, Vit D refill today)   Pain (3- With medication (Back/Leg)          43-year-old male here again to follow-up for pain due to severe progressive MS unfortunately has degenerative joint and back pain in the mid and low back as well as neuropathic pain in his hands and feet. I have okayed him to transition to Xtamptza--will need to increase dose dt pain flare up as winter weather is a problem--also 9 mg strength is the lowest--will increase to 13.5 mg with goal to goal to be more functional and active--still on Ocravis q 6  Months for MS. He has quit smoking. Back Pain  This is a chronic problem. The current episode started more than 1 year ago. The problem occurs constantly. The problem is unchanged. The pain is present in the gluteal and lumbar spine. The quality of the pain is described as aching. The pain is at a severity of 5/10. The symptoms are aggravated by lying down, position and coughing. Stiffness is present in the morning. Associated symptoms include bladder incontinence, leg pain, numbness, paresis, paresthesias, tingling and weakness. Pertinent negatives include no abdominal pain, bowel incontinence, chest pain, dysuria, fever, headaches, pelvic pain, perianal numbness or weight loss.  Risk factors include history of steroid use, lack of exercise and sedentary lifestyle. He has tried NSAIDs, walking, ice, muscle relaxant, chiropractic manipulation, heat, home exercises, analgesics and bed rest for the symptoms. The treatment provided significant relief. Leg Pain   The incident occurred more than 1 week ago. There was no injury mechanism. The quality of the pain is described as aching. The pain is at a severity of 5/10. The pain is mild. The pain has been improving since onset. Associated symptoms include muscle weakness, numbness and tingling. He reports no foreign bodies present. The symptoms are aggravated by movement, palpation and weight bearing. He has tried rest, heat, elevation, acetaminophen, immobilization, ice, non-weight bearing and NSAIDs for the symptoms. The treatment provided moderate relief. Neurologic Problem  The patient's primary symptoms include an altered mental status, clumsiness, focal sensory loss, focal weakness, a loss of balance, a visual change and weakness. The patient's pertinent negatives include no memory loss, near-syncope or slurred speech. This is a chronic problem. The current episode started more than 1 year ago. The neurological problem developed insidiously. The problem has been gradually worsening since onset. There was no focality noted. Associated symptoms include back pain, bladder incontinence, confusion, dizziness and fatigue. Pertinent negatives include no abdominal pain, auditory change, aura, bowel incontinence, chest pain, diaphoresis, fever, headaches, light-headedness, nausea, neck pain, palpitations, shortness of breath or vomiting. Past treatments include walking, medication and acetaminophen. The treatment provided moderate relief. His past medical history is significant for mood changes. There is no history of a bleeding disorder, a clotting disorder, a CVA, dementia, head trauma, liver disease or seizures.              Past Medical History:   Diagnosis Date    Chronic bilateral low back pain with sciatica 12/10/2019    Mild L4-5 degenerative disc disease and minimal facet degenerative  changes at L5-S1.  Otherwise normal lumbosacral spine. Mild remote benign lower thoracic compression fractures and mild to  moderate thoracic degenerative disc disease.  Erectile dysfunction     ULISSES virus antibody [facility]10/2021    [facility]    Multiple sclerosis ([address] 12/10/2019    Sees [facility] Date of onset: 2006-Left optic neuritis Date of diagnosis of MS: 2006-Diagnosed by Dr. Lakshmi Buckley course at onset: Relapsing-Remitting Current disease course: Relapsing-Remitting Previous disease therapies:  IVMP 2006 Copaxone-2006-2007 (stopped due to expense) IVMP 2008 (optic neuritis on right) IVMP -2015 (Left hand weaknes    OAB (overactive bladder) 06/15/2018    Spasticity 09/15/2015       History reviewed. No pertinent surgical history. Social History     Socioeconomic History    Marital status: Single     Spouse name: None    Number of children: None    Years of education: 15    Highest education level: Some college, no degree   Occupational History    Occupation: disabled dt MS   Tobacco Use    Smoking status: Former Smoker     Packs/day: 1.00     Quit date: 2021     Years since quittin.6    Smokeless tobacco: Never Used   Vaping Use    Vaping Use: Every day    Substances: Nicotine   Substance and Sexual Activity    Alcohol use: Not Currently    Drug use: Never    Sexual activity: None   Other Topics Concern    None   Social History Narrative    Social/Functional History     on SSDI for MS    Lives in [facility] with his twin brother and long term friend-Madan. He still pays most the bills. He was taken advantage of by the situation. He grew up in [employer] school but is no longer [employer] and is agnostic if not in [place].    Still able to drive-but can't afford a car.     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 Social Determinants of Health     Financial Resource Strain:     Difficulty of Paying Living Expenses: Not on file   Food Insecurity:     Worried About Running Out of Food in the Last Year: Not on file    Triston of Food in the Last Year: Not on file   Transportation Needs:     Lack of Transportation (Medical): Not on file    Lack of Transportation (Non-Medical): Not on file   Physical Activity:     Days of Exercise per Week: Not on file    Minutes of Exercise per Session: Not on file   Stress:     Feeling of Stress : Not on file   Social Connections:     Frequency of Communication with Friends and Family: Not on file    Frequency of Social Gatherings with Friends and Family: Not on file    Attends Advent Services: Not on file    Active Member of [address] [place] [address] or Organizations: Not on file    Attends Club or Organization Meetings: Not on file    Marital Status: Not on file   Intimate Partner Violence:     Fear of Current or Ex-Partner: Not on file    Emotionally Abused: Not on file    Physically Abused: Not on file    Sexually Abused: Not on file   Housing Stability:     Unable to Pay for Housing in the Last Year: Not on file    Number of [place] in the Last Year: Not on file    Unstable Housing in the Last Year: Not on file       History reviewed. No pertinent family history. Allergies   Allergen Reactions    Prednisone Other (See Comments)     Cant have hector Davalos Nail study he is in with [facility]       Review of Systems   Constitutional: Positive for activity change and fatigue. Negative for chills, diaphoresis, fever and weight loss. HENT: Negative for congestion, ear discharge, ear pain, hearing loss, nosebleeds, sore throat and tinnitus. Eyes: Negative for photophobia, pain and redness.    Respiratory: Negative for cough, shortness of breath, wheezing and stridor. Shortness of breath on exertion   Cardiovascular: Negative for chest pain, palpitations, leg swelling and near-syncope. Gastrointestinal: Positive for constipation. Negative for abdominal pain, blood in stool, bowel incontinence, diarrhea, nausea and vomiting. Endocrine: Negative for polydipsia. Genitourinary: Positive for bladder incontinence. Negative for dysuria, flank pain, frequency, hematuria, pelvic pain and urgency. Musculoskeletal: Positive for back pain, gait problem and myalgias. Negative for neck pain. Skin: Negative for rash. Allergic/Immunologic: Positive for immunocompromised state. Negative for environmental allergies. Neurological: Positive for dizziness, tingling, focal weakness, weakness, numbness, paresthesias and loss of balance. Negative for tremors, seizures, light-headedness and headaches. Hematological: Does not bruise/bleed easily. Psychiatric/Behavioral: Positive for confusion. Negative for hallucinations, memory loss and suicidal ideas. The patient is not nervous/anxious. Objective    There were no vitals filed for this visit.     No data recorded            PHYSICAL EXAMINATION:  [ INSTRUCTIONS:  \"[x]\" Indicates a positive item  \"[]\" Indicates a negative item  -- DELETE ALL ITEMS NOT EXAMINED]    [x] Alert  [x] Oriented to person/place/time      [x] No apparent distress  [x] Not toxic appearing    [x] Face complexion normal appearing [x] Sclera clear  [x] Lips are not cyanotic      [x] Breathing appears normal  [] Appears tachypneic      [] Rash on visible skin    [x] Cranial Nerves II-XII grossly intact    [x] Motor grossly intact in visible upper extremities, including stiff but intact range of motion in cervical, upper extremity and thoracic  [x] Motor grossly intact in visible lower extremities, including normal range of motion in the hips and knees for stiffness in the lumbar spine    [x] Normal Mood  [x] Not anxious appearing    [x] Not depressed appearing  [x] Not confused appearing      [x]  Good short term memory  [x]  Good long term memory    [x]  Able to follow 2-3 step commands    Due to this being a TeleHealth encounter, evaluation of the following organ systems is limited: Vitals/Constitutional/EENT/Resp/CV/GI//MS/Neuro/Skin/Heme-Lymph-Imm. ASSESSMENT/PLAN:     After a thorough review and discussion of the previous medical records, patient comprehensive medical, surgical, and family and social history, Review of Systems, their OARRS, their Screener and Opioid Assessment for Patients with Pain (SOAPP®-R), recent diagnostics, and symptomatic results to previous treatment, it is my impression that the patients is suffering with progressive and severe:     Diagnosis Orders   1. Neuropathic pain of both legs  oxyCODONE ER (XTAMPZA ER) 13.5 MG C12A   2. Chronic pain of both shoulders  oxyCODONE ER (XTAMPZA ER) 13.5 MG C12A   3. Chronic bilateral thoracic back pain  oxyCODONE ER (XTAMPZA ER) 13.5 MG C12A   4. Chronic bilateral low back pain with bilateral sciatica  oxyCODONE ER (XTAMPZA ER) 13.5 MG C12A   5. Multiple sclerosis [address] [address]     6. High risk medication use  oxyCODONE ER (XTAMPZA ER) 13.5 MG C12A       I am also concerned by lifestyle and mood issues including:    Past Medical History:   Diagnosis Date    Chronic bilateral low back pain with sciatica 12/10/2019    Mild L4-5 degenerative disc disease and minimal facet degenerative  changes at L5-S1.  Otherwise normal lumbosacral spine. Mild remote benign lower thoracic compression fractures and mild to  moderate thoracic degenerative disc disease.     Erectile dysfunction     ULISSES virus antibody positive 06/10/2021    [facility]    Multiple sclerosis ([facility] [address] 12/10/2019    Sees [facility] Date of onset: August 2006-Left optic neuritis Date of diagnosis of MS: September 2006-Diagnosed by Dr. Fanny Solares course at onset: Relapsing-Remitting Current disease course: Relapsing-Remitting Previous disease therapies:  IVMP August 2006 Copaxone-September 2006-January 2007 (stopped due to expense) IVMP July 2008 (optic neuritis on right) IVMP November 6-8, 2015 (Left hand weaknes    OAB (overactive bladder) 06/15/2018    Spasticity 09/15/2015           Given their medication, chronic pain and lifestyle and medications they are at risk for :    Falls, constipation, addiction, toxicity due to controlled/opiate medications  Loss of livelyhood due to severe pain, debility, weight gain and  vitamin D deficiency    The patient was educated regarding proper diet, fitness routine, and regulatory restrictions concerning pain medications. Previous notes, comprehensive past medical, surgical, family history, and diagnostics were reviewed. Patient education and councelling were provided regarding off label use,treatment options and medication and injection risks. Overall greater than 25 minutes spent in direct and indirect patient care. Current and old OARRS ([address] [place] [address] Automated Prescription Reporting System) records reviewed, all refills reviewed since last visit,  Behavioral agreement/RALPH regulations   and Toxicology screen was reviewed with patient and is up to date. There are no current red flags. They are making good progress regarding pain relief, they are performing at a functional level regarding activities of daily living, family interactions and psychological functioning, they're not having any adverse effects or side effects from the current medications, and I see no findings of aberrant drug taking or addiction related behaviors. The patient is aware that they have a chronic pain condition and they may require opiates dosing for life. All efforts will be made to wean to the lowest effective dose. Other therapies for pain have not been effective including nonopiate medications. Injections and exercises are only partially effective.   A Rx for Narcan was offered to help prevent accidental overdose. RX Monitoring 9/27/2021   Acute Pain Prescriptions Prescription exceeds daily limit for a specific reason. See comments or note. ;Not required given exclusionary diagnoses. ..;Severe pain not adequately treated with lower dose. Periodic Controlled Substance Monitoring Possible medication side effects, risk of tolerance/dependence & alternative treatments discussed. ;No signs of potential drug abuse or diversion identified. ;Assessed functional status. ;Obtaining appropriate analgesic effect of treatment. Chronic Pain > 50 MEDD Re-evaluated the status of the patient's underlying condition causing pain. ;Considered consultation with a specialist.;Obtained or confirmed \"Consent for Opioid Use\" on file. Patient is currently taking:       I am having Kristopher Mendenhall \"B[place]\" start on Xtampza ER. I am also having him maintain his vitamin D, Ocrelizumab (OCREVUS IV), baclofen, gabapentin, and baclofen. I also recommend the following Medications:    Orders Placed This Encounter   Medications    oxyCODONE ER (XTAMPZA ER) 13.5 MG C12A     Sig: Take 13.5 mg by mouth every 6 hours as needed (severe) for up to 30 days. Dispense:  120 each     Refill:  0     Reduce doses taken as pain becomes manageable        -which helps with pain and function. Otherwise, continue the current pain medications that I have prescibed. Radiologic:   Old films reviewed,     I discussed results with patients. see Follow up plans below  For any new studies. Care Everywhere Updates:  requested and reviewed. No new issues noted. Electrodiagnostic:  Previous studies requested,     I discussed results with patient. See follow-up plans for new studies. Additional history obtained from independent sourcing including patient's family and caregivers.     Labs:  Previous labs reviewed     No results found for: NA, K, CL, CO2, BUN, CREATININE, GLU, CALCIUM, LABALBU, BILITOT, ALKPHOS, AST, ALT  No results found for: WBC, RBC, HGB, HCT, MCV, MCH, MCHC, RDW, PLT, MPV    Lab Results   Component Value Date    DSCOMMENT See Note 12/10/2019       Lab Results   Component Value Date    BUPREN Negative 12/10/2019       No results found for: METPHEN, PHENTERMINE, BENZOYL, ALPRAZ, ALPHAOHALPRA, CLONAZEPAM, 7AMINOCLONAZ, DIAZEP, SAWYER, OXAZ, TEMAZ, LORAZEPAM, MIDAZOLAM, ZOLPIDEM, GRAHAM, ETG, MARIJMET, PCP, PAINMGTDRUGP, EERPAINMGTPA, LABCREA      , I discussed results with patient. See follow-up plans for new studies. Therapies:  HEP-gentle stretching and relaxation techniques-demonstrated with patient-they are to do them twice a day. They are also advised to make the following lifestyle changes:  Goals      SOAPP-R      12/10/19 score: 17- moderate risk  01/02/2020 score: 16 - moderate risk   1/22/20 score: 15- moderarte risk  2/28/20 score: 14- moderate risk    5- score - 12 - moderate risk             Injections or Epidurals:  Injection options were discussed. Monthly trigger point injections add vitamin B12 when able. Patient gave verbal consent to ordered injections. See follow-up plans for planned injections. Supplements:  Vitamin D with increased dosing during the rainy months, add co-Q10 for heart health. Education was given on:   Dietary and Fitness--daily stretches and low carb diet-in chair Yoga when possible      Note controlled medication/opiate drug therapy requires intensive monitoring for toxicity and addiction. Follow up with Primary Care Physician regarding their general medical needs. Stressed the importance of following up with PCP and specialists for his/her chronic diseases, health, CV, and cancer screening and continued care. Will follow disease activity/progression and adjust therapeutic regimen to disease activity and severity. Discussed medication dosage, usage, goals of therapy, and side effects.   Available test results were reviewed -Discussed findings, impression and plan with patient. An additional  minutes were spent outside of the patient visit to review records. Additional time spent with the patient to discuss their questions. Additional time spent with the patient devoted to discussing treatment strategy, planning, and implementation generalized musculoskeletal health plan. Patient understands above plan; questions asked and answered. Patient agrees to plan as noted above. On this date  1/20/22 I have spent 6 minutes reviewing previous notes, test results and face to face (virtual) with the patient discussing the diagnosis and importance of compliance with the treatment plan as well as documenting on the day of the visit. At least 50% of the visit was involved in the discussion of the options for treatment. We discussed exercises, medication, interventional therapies and surgery. Healthy life style is essential with patient hard work to achieve the wellness. In addition; discussion with the patient and/or family about any of the diagnostic results, impressions and/or recommended diagnostic studies, prognosis, risks and benefits of treatment options, instructions for treatment and/or follow-up, importance of compliance with chosen treatment options, risk-factor reduction, and patient/family education. They are to follow up in 2-2 1/2 months to review medication, efficacy of injections, pill counts, OARRS check, SOAPPR assessment, review diagnostics, to review previous and future treatment plans and assess appropriateness for continued therapy,  and regarding the efficacy of current plan and future treatment. New Diagnostics  No orders of the defined types were placed in this encounter. An  electronic signature was used to authenticate this note.     -Dr Josue Anguiano, DO       With MA assistance from:   Hieu Mandel MA     19}    Pursuant to the emergency declaration under the [address] and the National Emergencies Act, [facility] waiver authority and the [employer] Act, this Virtual  Visit was conducted, with patient's consent, to reduce the patient's risk of exposure to COVID-19 and provide continuity of care for an established patient. Services were provided through a video synchronous discussion virtually to substitute for in-person clinic visit.

## 2022-01-31 DIAGNOSIS — M25.512 CHRONIC PAIN OF BOTH SHOULDERS: ICD-10-CM

## 2022-01-31 DIAGNOSIS — G89.29 CHRONIC BILATERAL THORACIC BACK PAIN: ICD-10-CM

## 2022-01-31 DIAGNOSIS — M54.42 CHRONIC BILATERAL LOW BACK PAIN WITH BILATERAL SCIATICA: ICD-10-CM

## 2022-01-31 DIAGNOSIS — G89.29 CHRONIC BILATERAL LOW BACK PAIN WITH BILATERAL SCIATICA: ICD-10-CM

## 2022-01-31 DIAGNOSIS — M25.511 CHRONIC PAIN OF BOTH SHOULDERS: ICD-10-CM

## 2022-01-31 DIAGNOSIS — G89.29 CHRONIC PAIN OF BOTH SHOULDERS: ICD-10-CM

## 2022-01-31 DIAGNOSIS — M54.41 CHRONIC BILATERAL LOW BACK PAIN WITH BILATERAL SCIATICA: ICD-10-CM

## 2022-01-31 DIAGNOSIS — G57.93 NEUROPATHIC PAIN OF BOTH LEGS: ICD-10-CM

## 2022-01-31 DIAGNOSIS — Z79.899 HIGH RISK MEDICATION USE: ICD-10-CM

## 2022-01-31 DIAGNOSIS — M54.6 CHRONIC BILATERAL THORACIC BACK PAIN: ICD-10-CM

## 2022-01-31 RX ORDER — OXYCODONE 13.5 MG/1
13.5 CAPSULE, EXTENDED RELEASE ORAL EVERY 6 HOURS PRN
Qty: 120 EACH | Refills: 0 | Status: SHIPPED | OUTPATIENT
Start: 2022-01-31 | End: 2022-03-15 | Stop reason: SDUPTHER

## 2022-01-31 NOTE — TELEPHONE ENCOUNTER
R/F Xtampza; Original pharmacy does not feel comfortable filling due to increase in stregth of medication

## 2022-03-15 DIAGNOSIS — Z79.899 HIGH RISK MEDICATION USE: ICD-10-CM

## 2022-03-15 DIAGNOSIS — G89.29 CHRONIC BILATERAL THORACIC BACK PAIN: ICD-10-CM

## 2022-03-15 DIAGNOSIS — M25.512 CHRONIC PAIN OF BOTH SHOULDERS: ICD-10-CM

## 2022-03-15 DIAGNOSIS — M25.511 CHRONIC PAIN OF BOTH SHOULDERS: ICD-10-CM

## 2022-03-15 DIAGNOSIS — G57.93 NEUROPATHIC PAIN OF BOTH LEGS: ICD-10-CM

## 2022-03-15 DIAGNOSIS — G89.29 CHRONIC PAIN OF BOTH SHOULDERS: ICD-10-CM

## 2022-03-15 DIAGNOSIS — M54.41 CHRONIC BILATERAL LOW BACK PAIN WITH BILATERAL SCIATICA: ICD-10-CM

## 2022-03-15 DIAGNOSIS — M54.6 CHRONIC BILATERAL THORACIC BACK PAIN: ICD-10-CM

## 2022-03-15 DIAGNOSIS — M54.42 CHRONIC BILATERAL LOW BACK PAIN WITH BILATERAL SCIATICA: ICD-10-CM

## 2022-03-15 DIAGNOSIS — G89.29 CHRONIC BILATERAL LOW BACK PAIN WITH BILATERAL SCIATICA: ICD-10-CM

## 2022-03-15 RX ORDER — OXYCODONE 13.5 MG/1
13.5 CAPSULE, EXTENDED RELEASE ORAL EVERY 6 HOURS PRN
Qty: 120 EACH | Refills: 0 | Status: SHIPPED | OUTPATIENT
Start: 2022-03-15 | End: 2022-04-29 | Stop reason: SDUPTHER

## 2022-03-31 ENCOUNTER — TELEMEDICINE (OUTPATIENT)
Dept: PHYSICAL MEDICINE AND REHAB | Age: 37
End: 2022-03-31
Payer: MEDICARE

## 2022-03-31 DIAGNOSIS — G35 MULTIPLE SCLEROSIS (HCC): ICD-10-CM

## 2022-03-31 DIAGNOSIS — G89.0 THALAMIC PAIN SYNDROME: ICD-10-CM

## 2022-03-31 DIAGNOSIS — Z79.899 HIGH RISK MEDICATION USE: ICD-10-CM

## 2022-03-31 DIAGNOSIS — G89.29 CHRONIC BILATERAL LOW BACK PAIN WITH LEFT-SIDED SCIATICA: ICD-10-CM

## 2022-03-31 DIAGNOSIS — M54.42 CHRONIC BILATERAL LOW BACK PAIN WITH LEFT-SIDED SCIATICA: ICD-10-CM

## 2022-03-31 DIAGNOSIS — N31.9 NEUROGENIC BLADDER: ICD-10-CM

## 2022-03-31 DIAGNOSIS — G57.93 NEUROPATHIC PAIN OF BOTH LEGS: Primary | ICD-10-CM

## 2022-03-31 PROCEDURE — G8484 FLU IMMUNIZE NO ADMIN: HCPCS | Performed by: PHYSICAL MEDICINE & REHABILITATION

## 2022-03-31 PROCEDURE — 1036F TOBACCO NON-USER: CPT | Performed by: PHYSICAL MEDICINE & REHABILITATION

## 2022-03-31 PROCEDURE — G8427 DOCREV CUR MEDS BY ELIG CLIN: HCPCS | Performed by: PHYSICAL MEDICINE & REHABILITATION

## 2022-03-31 PROCEDURE — G8421 BMI NOT CALCULATED: HCPCS | Performed by: PHYSICAL MEDICINE & REHABILITATION

## 2022-03-31 PROCEDURE — 99214 OFFICE O/P EST MOD 30 MIN: CPT | Performed by: PHYSICAL MEDICINE & REHABILITATION

## 2022-03-31 ASSESSMENT — ENCOUNTER SYMPTOMS
VOMITING: 0
COUGH: 0
WHEEZING: 0
EYE PAIN: 0
BOWEL INCONTINENCE: 0
SHORTNESS OF BREATH: 0
BLOOD IN STOOL: 0
EYE REDNESS: 0
ABDOMINAL PAIN: 0
BACK PAIN: 1
VISUAL CHANGE: 1
PHOTOPHOBIA: 0
CONSTIPATION: 1
STRIDOR: 0
SORE THROAT: 0
DIARRHEA: 0
NAUSEA: 0

## 2022-03-31 NOTE — PROGRESS NOTES
TELEHEALTH EVALUATION -- Audio/Visual (During JGDGW-08 public health emergency)    Due to COVID 19 outbreak, patient's office visit was converted to a virtual visit. Patient was contacted and agreed to proceed with a virtual visit via  [email or web address]   The risks and benefits of converting to a virtual visit were discussed in light of the current infectious disease epidemic. Patient also understood that insurance coverage and co-pays are up to their individual insurance plans. Subjective       This patient has requested an audio/video evaluation for the following concern(s):    HPI:     Back Pain     Leg Pain bilateral    Medication Refill Xtampza ER, Baclofen, Vit D, refill today    Pain 3- back with medication            28-year-old male here again to follow-up for pain due to severe progressive MS unfortunately has degenerative joint and back pain in the mid and low back as well as neuropathic pain in his hands and feet. I have okayed him to transition to [place] -new dose is somewhat better. Back Pain  This is a chronic problem. The current episode started more than 1 year ago. The problem occurs constantly. The problem is unchanged. The pain is present in the gluteal and lumbar spine. The quality of the pain is described as aching. The pain is at a severity of 5/10. The symptoms are aggravated by lying down, position and coughing. Stiffness is present in the morning. Associated symptoms include bladder incontinence, leg pain, numbness, paresis, paresthesias, tingling and weakness. Pertinent negatives include no abdominal pain, bowel incontinence, chest pain, dysuria, fever, headaches, pelvic pain, perianal numbness or weight loss. Risk factors include history of steroid use, lack of exercise and sedentary lifestyle. He has tried NSAIDs, walking, ice, muscle relaxant, chiropractic manipulation, heat, home exercises, analgesics and bed rest for the symptoms. The treatment provided significant relief. Leg Pain   The incident occurred more than 1 week ago. There was no injury mechanism. The quality of the pain is described as aching. The pain is at a severity of 5/10. The pain is mild. The pain has been improving since onset. Associated symptoms include muscle weakness, numbness and tingling. He reports no foreign bodies present. The symptoms are aggravated by movement, palpation and weight bearing. He has tried rest, heat, elevation, acetaminophen, immobilization, ice, non-weight bearing and NSAIDs for the symptoms. The treatment provided moderate relief. Neurologic Problem  The patient's primary symptoms include an altered mental status, clumsiness, focal sensory loss, focal weakness, a loss of balance, a visual change and weakness. The patient's pertinent negatives include no memory loss, near-syncope or slurred speech. This is a chronic problem. The current episode started more than 1 year ago. The neurological problem developed insidiously. The problem has been gradually worsening since onset. There was no focality noted. Associated symptoms include back pain, bladder incontinence, confusion, dizziness and fatigue. Pertinent negatives include no abdominal pain, auditory change, aura, bowel incontinence, chest pain, diaphoresis, fever, headaches, light-headedness, nausea, neck pain, palpitations, shortness of breath or vomiting. Past treatments include walking, medication and acetaminophen. The treatment provided moderate relief. His past medical history is significant for mood changes. There is no history of a bleeding disorder, a clotting disorder, a CVA, dementia, head trauma, liver disease or seizures. Past Medical History:   Diagnosis Date    Chronic bilateral low back pain with sciatica 12/10/2019    Mild L4-5 degenerative disc disease and minimal facet degenerative  changes at L5-S1.  Otherwise normal lumbosacral spine.   Mild remote benign lower thoracic compression fractures and mild to  moderate thoracic degenerative disc disease.  Erectile dysfunction     ULISSES virus antibody positive 06/10/2021    [facility]    Multiple sclerosis ([address] 12/10/2019    Sees [facility] Date of onset: 2006-Left optic neuritis Date of diagnosis of MS: 2006-Diagnosed by Dr. Rd Tolentino course at onset: Relapsing-Remitting Current disease course: Relapsing-Remitting Previous disease therapies:  IVMP 2006 Copaxone-2006-2007 (stopped due to expense) IVMP 2008 (optic neuritis on right) IVMP -2015 (Left hand weaknes    OAB (overactive bladder) 06/15/2018    Spasticity 09/15/2015       History reviewed. No pertinent surgical history. Social History     Socioeconomic History    Marital status: Single     Spouse name: None    Number of children: None    Years of education: 15    Highest education level: Some college, no degree   Occupational History    Occupation: disabled dt MS   Tobacco Use    Smoking status: Former Smoker     Packs/day: 1.00     Quit date: 2021     Years since quittin.8    Smokeless tobacco: Never Used   Vaping Use    Vaping Use: Every day    Substances: Nicotine   Substance and Sexual Activity    Alcohol use: Not Currently    Drug use: Never    Sexual activity: None   Other Topics Concern    None   Social History Narrative    Social/Functional History     on SSDI for MS    Lives in [place] with his twin brother and long term friend-Madan. He still pays most the bills. He was taken advantage of by the situation. He grew up in [employer] but is no longer Muslim and is agnostic if not in [place].    Still able to drive-but can't afford a car. 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      Social Determinants of Health     Financial Resource Strain:     Difficulty of Paying Living Expenses: Not on file   Food Insecurity:     Worried About Running Out of Food in the Last Year: Not on file    Ran Out of Food in the Last Year: Not on file   Transportation Needs:     Lack of Transportation (Medical): Not on file    Lack of Transportation (Non-Medical): Not on file   Physical Activity:     Days of Exercise per Week: Not on file    Minutes of Exercise per Session: Not on file   Stress:     Feeling of Stress : Not on file   Social Connections:     Frequency of Communication with Friends and Family: Not on file    Frequency of Social Gatherings with Friends and Family: Not on file    Attends Jain Services: Not on file    Active Member of [address] [place] [address] [employer] or Organizations: Not on file    Attends Club or Organization Meetings: Not on file    Marital Status: Not on file   Intimate Partner Violence:     Fear of Current or Ex-Partner: Not on file    Emotionally Abused: Not on file    Physically Abused: Not on file    Sexually Abused: Not on file   Housing Stability:     Unable to Pay for Housing in the Last Year: Not on file    Number of [place] in the Last Year: Not on file    Unstable Housing in the Last Year: Not on file       History reviewed. No pertinent family history. No Known Allergies    Review of Systems   Constitutional: Positive for activity change and fatigue. Negative for chills, diaphoresis, fever and weight loss. HENT: Negative for congestion, ear discharge, ear pain, hearing loss, nosebleeds, sore throat and tinnitus. Eyes: Negative for photophobia, pain and redness. Respiratory: Negative for cough, shortness of breath, wheezing and stridor. Shortness of breath on exertion   Cardiovascular: Negative for chest pain, palpitations, leg swelling and near-syncope. Gastrointestinal: Positive for constipation. Negative for abdominal pain, blood in stool, bowel incontinence, diarrhea, nausea and vomiting. Endocrine: Negative for polydipsia. Genitourinary: Positive for bladder incontinence. Negative for dysuria, flank pain, frequency, hematuria, pelvic pain and urgency. Musculoskeletal: Positive for back pain, gait problem and myalgias. Negative for neck pain. Skin: Negative. Negative for rash. Allergic/Immunologic: Positive for immunocompromised state. Negative for environmental allergies. Neurological: Positive for dizziness, tingling, focal weakness, weakness, numbness, paresthesias and loss of balance. Negative for tremors, seizures, light-headedness and headaches. Hematological: Negative. Does not bruise/bleed easily. Psychiatric/Behavioral: Positive for confusion. Negative for hallucinations, memory loss and suicidal ideas. The patient is not nervous/anxious. Objective    There were no vitals filed for this visit.     No data recorded            PHYSICAL EXAMINATION:  [ INSTRUCTIONS:  \"[x]\" Indicates a positive item  \"[]\" Indicates a negative item  -- DELETE ALL ITEMS NOT EXAMINED]    [x] Alert  [x] Oriented to person/place/time      [x] No apparent distress  [x] Not toxic appearing    [x] Face complexion normal appearing [x] Sclera clear  [x] Lips are not cyanotic      [x] Breathing appears normal  [] Appears tachypneic      [] Rash on visible skin    [x] Cranial Nerves II-XII grossly intact    [x] Motor grossly intact in visible upper extremities, including stiff but intact range of motion in cervical, upper extremity and thoracic  [x] Motor grossly intact in visible lower extremities, including normal range of motion in the hips and knees for stiffness in the lumbar spine    [x] Normal Mood  [x] Not anxious appearing    [x] Not depressed appearing  [x] Not confused appearing      [x]  Good short term memory  [x]  Good long term memory    [x]  Able to follow 2-3 step commands    Due to this being a TeleHealth encounter, evaluation of the following organ systems is limited: Vitals/Constitutional/EENT/Resp/CV/GI//MS/Neuro/Skin/Heme-Lymph-Imm. ASSESSMENT/PLAN:     After a thorough review and discussion of the previous medical records, patient comprehensive medical, surgical, and family and social history, Review of Systems, their OARRS, their Screener and Opioid Assessment for Patients with Pain (SOAPP®-R), recent diagnostics, and symptomatic results to previous treatment, it is my impression that the patients is suffering with progressive and severe:     Diagnosis Orders   1. Neuropathic pain of both legs     2. High risk medication use     3. Thalamic pain syndrome     4. Neurogenic bladder     5. Chronic bilateral low back pain with left-sided sciatica     6. Multiple sclerosis ([facility] [address]         I am also concerned by lifestyle and mood issues including:    Past Medical History:   Diagnosis Date    Chronic bilateral low back pain with sciatica 12/10/2019    Mild L4-5 degenerative disc disease and minimal facet degenerative  changes at L5-S1.  Otherwise normal lumbosacral spine. Mild remote benign lower thoracic compression fractures and mild to  moderate thoracic degenerative disc disease.     Erectile dysfunction     ULISSES virus antibody positive 06/10/2021    [facility]    Multiple sclerosis ([address] 12/10/2019    Sees [facility] Date of onset: August 2006-Left optic neuritis Date of diagnosis of MS: September 2006-Diagnosed by Dr. Yady Davalos course at onset: Relapsing-Remitting Current disease course: Relapsing-Remitting Previous disease therapies:  IVMP August 2006 Copaxone-September 2006-January 2007 (stopped due to expense) IVMP July 2008 (optic neuritis on right) IVMP November 6-8, 2015 (Left hand weaknes    OAB (overactive bladder) 06/15/2018    Spasticity 09/15/2015           Given their medication, chronic pain and lifestyle and medications they are at risk for :    Falls, constipation, addiction, toxicity due to controlled/opiate medications  Loss of livelyhood due to severe pain, debility, weight gain and  vitamin D deficiency    The patient was educated regarding proper diet, fitness routine, and regulatory restrictions concerning pain medications. Previous notes, comprehensive past medical, surgical, family history, and diagnostics were reviewed. Patient education and councelling were provided regarding off label use,treatment options and medication and injection risks. Overall greater than 25 minutes spent in direct and indirect patient care. Current and old OARRS ([place] Automated Prescription Reporting System) records reviewed, all refills reviewed since last visit,  Behavioral agreement/[facility]ALPH regulations   and Toxicology screen was reviewed with patient and is up to date. There are no current red flags. They are making good progress regarding pain relief, they are performing at a functional level regarding activities of daily living, family interactions and psychological functioning, they're not having any adverse effects or side effects from the current medications, and I see no findings of aberrant drug taking or addiction related behaviors. The patient is aware that they have a chronic pain condition and they may require opiates dosing for life. All efforts will be made to wean to the lowest effective dose. Other therapies for pain have not been effective including nonopiate medications. Injections and exercises are only partially effective. A Rx for Narcan was offered to help prevent accidental overdose. RX Monitoring 9/27/2021   Acute Pain Prescriptions Prescription exceeds daily limit for a specific reason. See comments or note. ;Not required given exclusionary diagnoses. ..;Severe pain not adequately treated with lower dose. Periodic Controlled Substance Monitoring Possible medication side effects, risk of tolerance/dependence & alternative treatments discussed. ;No signs of potential drug abuse or diversion identified. ;Assessed functional status. ;Obtaining appropriate analgesic effect of treatment. Chronic Pain > 50 MEDD Re-evaluated the status of the patient's underlying condition causing pain. ;Considered consultation with a specialist.;Obtained or confirmed \"Consent for Opioid Use\" on file. Patient is currently taking:       I am having Elle Browning. Abdirashid \"[place]\" maintain his vitamin D, Ocrelizumab (OCREVUS IV), baclofen, gabapentin, baclofen, and Xtampza ER. I also recommend the following Medications:    No orders of the defined types were placed in this encounter. Xtamza-13.5 3- 4/day  He has also quit smoking.     -which helps with pain and function. Otherwise, continue the current pain medications that I have prescibed. Radiologic:   Old films reviewed,     I discussed results with patients. see Follow up plans below  For any new studies. Care Everywhere Updates:  requested and reviewed. No new issues noted. Electrodiagnostic:  Previous studies requested,     I discussed results with patient. See follow-up plans for new studies. Additional history obtained from independent sourcing including patient's family and caregivers. Labs:  Previous labs reviewed     No results found for: NA, K, CL, CO2, BUN, CREATININE, GLU, CALCIUM, LABALBU, BILITOT, ALKPHOS, AST, ALT  No results found for: WBC, RBC, HGB, HCT, MCV, MCH, MCHC, RDW, PLT, MPV    Lab Results   Component Value Date    DSCOMMENT See Note 12/10/2019       Lab Results   Component Value Date    BUPREN Negative 12/10/2019       No results found for: METPHEN, PHENTERMINE, BENZOYL, ALPRAZ, ALPHAOHALPRA, CLONAZEPAM, 7AMINOCLONAZ, DIAZEP, SAWYER, OXAZ, TEMAZ, LORAZEPAM, MIDAZOLAM, ZOLPIDEM, GRAHAM, ETG, MARIJMET, PCP, PAINMGTDRUGP, EERPAINMGTPA, LABCREA      , I discussed results with patient. See follow-up plans for new studies.     Therapies:  HEP-gentle stretching and relaxation techniques-demonstrated with patient-they are to do them twice a day. They are also advised to make the following lifestyle changes:  Goals      SOAPP-R      12/10/19 score: 17- moderate risk  01/02/2020 score: 16 - moderate risk   1/22/20 score: 15- moderarte risk  2/28/20 score: 14- moderate risk    5- score - 12 - moderate risk             Injections or Epidurals:  Injection options were discussed. Call as needed- Monthly trigger point injections add vitamin B12 when able. Patient gave verbal consent to ordered injections. See follow-up plans for planned injections. Supplements:  Vitamin D with increased dosing during the rainy months, add co-Q10 for heart health. Education was given on:   Dietary and Fitness--daily stretches and low carb diet-in chair Yoga when possible      Note controlled medication/opiate drug therapy requires intensive monitoring for toxicity and addiction. Follow up with Primary Care Physician regarding their general medical needs. Stressed the importance of following up with PCP and specialists for his/her chronic diseases, health, CV, and cancer screening and continued care. Will follow disease activity/progression and adjust therapeutic regimen to disease activity and severity. Discussed medication dosage, usage, goals of therapy, and side effects. Available test results were reviewed -Discussed findings, impression and plan with patient. An additional  minutes were spent outside of the patient visit to review records. Additional time spent with the patient to discuss their questions. Additional time spent with the patient devoted to discussing treatment strategy, planning, and implementation generalized musculoskeletal health plan. Patient understands above plan; questions asked and answered. Patient agrees to plan as noted above.           On this date  3/31/22 I have spent 5 minutes reviewing previous notes, test results and face to face (virtual) with the patient discussing the diagnosis and importance of compliance with the treatment plan as well as documenting on the day of the visit. At least 50% of the visit was involved in the discussion of the options for treatment. We discussed exercises, medication, interventional therapies and surgery. Healthy life style is essential with patient hard work to achieve the wellness. In addition; discussion with the patient and/or family about any of the diagnostic results, impressions and/or recommended diagnostic studies, prognosis, risks and benefits of treatment options, instructions for treatment and/or follow-up, importance of compliance with chosen treatment options, risk-factor reduction, and patient/family education. They are to follow up in 2-2 1/2 months to review medication, efficacy of injections, pill counts, OARRS check, SOAPPR assessment, review diagnostics, to review previous and future treatment plans and assess appropriateness for continued therapy,  and regarding the efficacy of current plan and future treatment. New Diagnostics  No orders of the defined types were placed in this encounter. An  electronic signature was used to authenticate this note. -Dr Aixa Gotti, DO       With MA assistance from:   Corie Benitez MA     19}    Pursuant to the emergency declaration under the [address] [facility] waiver authority and the [employer] Act, this Virtual  Visit was conducted, with patient's consent, to reduce the patient's risk of exposure to COVID-19 and provide continuity of care for an established patient. Services were provided through a video synchronous discussion virtually to substitute for in-person clinic visit.

## 2022-04-28 DIAGNOSIS — M25.512 CHRONIC PAIN OF BOTH SHOULDERS: ICD-10-CM

## 2022-04-28 DIAGNOSIS — G89.29 CHRONIC PAIN OF BOTH SHOULDERS: ICD-10-CM

## 2022-04-28 DIAGNOSIS — M54.41 CHRONIC BILATERAL LOW BACK PAIN WITH BILATERAL SCIATICA: ICD-10-CM

## 2022-04-28 DIAGNOSIS — G57.93 NEUROPATHIC PAIN OF BOTH LEGS: ICD-10-CM

## 2022-04-28 DIAGNOSIS — G89.29 CHRONIC BILATERAL LOW BACK PAIN WITH BILATERAL SCIATICA: ICD-10-CM

## 2022-04-28 DIAGNOSIS — Z79.899 HIGH RISK MEDICATION USE: ICD-10-CM

## 2022-04-28 DIAGNOSIS — M54.6 CHRONIC BILATERAL THORACIC BACK PAIN: ICD-10-CM

## 2022-04-28 DIAGNOSIS — M25.511 CHRONIC PAIN OF BOTH SHOULDERS: ICD-10-CM

## 2022-04-28 DIAGNOSIS — M54.42 CHRONIC BILATERAL LOW BACK PAIN WITH BILATERAL SCIATICA: ICD-10-CM

## 2022-04-28 DIAGNOSIS — G89.29 CHRONIC BILATERAL THORACIC BACK PAIN: ICD-10-CM

## 2022-04-29 RX ORDER — OXYCODONE 13.5 MG/1
13.5 CAPSULE, EXTENDED RELEASE ORAL EVERY 6 HOURS PRN
Qty: 120 EACH | Refills: 0 | Status: SHIPPED | OUTPATIENT
Start: 2022-04-29 | End: 2022-06-16 | Stop reason: SDUPTHER

## 2022-06-15 DIAGNOSIS — M54.6 CHRONIC BILATERAL THORACIC BACK PAIN: ICD-10-CM

## 2022-06-15 DIAGNOSIS — Z79.899 HIGH RISK MEDICATION USE: ICD-10-CM

## 2022-06-15 DIAGNOSIS — M25.511 CHRONIC PAIN OF BOTH SHOULDERS: ICD-10-CM

## 2022-06-15 DIAGNOSIS — M25.512 CHRONIC PAIN OF BOTH SHOULDERS: ICD-10-CM

## 2022-06-15 DIAGNOSIS — M54.41 CHRONIC BILATERAL LOW BACK PAIN WITH BILATERAL SCIATICA: ICD-10-CM

## 2022-06-15 DIAGNOSIS — G89.29 CHRONIC BILATERAL THORACIC BACK PAIN: ICD-10-CM

## 2022-06-15 DIAGNOSIS — G57.93 NEUROPATHIC PAIN OF BOTH LEGS: ICD-10-CM

## 2022-06-15 DIAGNOSIS — G89.29 CHRONIC PAIN OF BOTH SHOULDERS: ICD-10-CM

## 2022-06-15 DIAGNOSIS — M54.42 CHRONIC BILATERAL LOW BACK PAIN WITH BILATERAL SCIATICA: ICD-10-CM

## 2022-06-15 DIAGNOSIS — G89.29 CHRONIC BILATERAL LOW BACK PAIN WITH BILATERAL SCIATICA: ICD-10-CM

## 2022-06-16 RX ORDER — OXYCODONE 13.5 MG/1
13.5 CAPSULE, EXTENDED RELEASE ORAL EVERY 6 HOURS PRN
Qty: 120 EACH | Refills: 0 | Status: SHIPPED | OUTPATIENT
Start: 2022-06-16 | End: 2022-07-16

## 2022-06-30 ENCOUNTER — TELEMEDICINE (OUTPATIENT)
Dept: PHYSICAL MEDICINE AND REHAB | Age: 37
End: 2022-06-30
Payer: MEDICARE

## 2022-06-30 DIAGNOSIS — G57.93 NEUROPATHIC PAIN OF BOTH LEGS: Primary | ICD-10-CM

## 2022-06-30 DIAGNOSIS — M25.512 CHRONIC PAIN OF BOTH SHOULDERS: ICD-10-CM

## 2022-06-30 DIAGNOSIS — M25.511 CHRONIC PAIN OF BOTH SHOULDERS: ICD-10-CM

## 2022-06-30 DIAGNOSIS — G89.29 CHRONIC BILATERAL LOW BACK PAIN WITH LEFT-SIDED SCIATICA: ICD-10-CM

## 2022-06-30 DIAGNOSIS — G89.29 CHRONIC PAIN OF BOTH SHOULDERS: ICD-10-CM

## 2022-06-30 DIAGNOSIS — M54.42 CHRONIC BILATERAL LOW BACK PAIN WITH LEFT-SIDED SCIATICA: ICD-10-CM

## 2022-06-30 DIAGNOSIS — G89.0 THALAMIC PAIN SYNDROME: ICD-10-CM

## 2022-06-30 PROCEDURE — G8421 BMI NOT CALCULATED: HCPCS | Performed by: PHYSICAL MEDICINE & REHABILITATION

## 2022-06-30 PROCEDURE — 1036F TOBACCO NON-USER: CPT | Performed by: PHYSICAL MEDICINE & REHABILITATION

## 2022-06-30 PROCEDURE — 99214 OFFICE O/P EST MOD 30 MIN: CPT | Performed by: PHYSICAL MEDICINE & REHABILITATION

## 2022-06-30 PROCEDURE — G8427 DOCREV CUR MEDS BY ELIG CLIN: HCPCS | Performed by: PHYSICAL MEDICINE & REHABILITATION

## 2022-06-30 ASSESSMENT — ENCOUNTER SYMPTOMS
WHEEZING: 0
PHOTOPHOBIA: 0
EYE PAIN: 0
CONSTIPATION: 1
STRIDOR: 0
BACK PAIN: 1
VISUAL CHANGE: 1
SHORTNESS OF BREATH: 0
BOWEL INCONTINENCE: 0
DIARRHEA: 0
EYE REDNESS: 0
SORE THROAT: 0
ABDOMINAL PAIN: 0
BLOOD IN STOOL: 0
NAUSEA: 0
VOMITING: 0
COUGH: 0

## 2022-06-30 NOTE — PROGRESS NOTES
TELEHEALTH EVALUATION -- Audio/Visual (During UWCYN-81 public health emergency)    Due to COVID 19 outbreak, patient's office visit was converted to a virtual visit. Patient was contacted and agreed to proceed with a virtual visit via  [email or web address] me   The risks and benefits of converting to a virtual visit were discussed in light of the current infectious disease epidemic. Patient also understood that insurance coverage and co-pays are up to their individual insurance plans. Subjective       This patient has requested an audio/video evaluation for the following concern(s):    HPI:        Back Pain      Leg Pain bilateral    Leg Pain     Medication Refill oxycodone er, baclofen, vit, d     Pain 4- back with medication            81-year-old male here again to follow-up for pain due to severe progressive MS unfortunately has degenerative joint and back pain in the mid and low back as well as neuropathic pain in his hands and feet. I have okayed him to transition to Xtamptza--will need to increase dose dt pain flare up as winter weather is a problem--also 9 mg strength is the lowest--will increase to 13.5 mg with goal to goal to be more functional and active--still on Ocravis q 6  Months for MS. He has  Resumed smoking. Neuropathic pain is worse at night. We discussed topical Lidocaine --no help in past.       Back Pain  This is a chronic problem. The current episode started more than 1 year ago. The problem occurs constantly. The problem is unchanged. The pain is present in the gluteal and lumbar spine. The quality of the pain is described as aching. The pain is at a severity of 5/10. The symptoms are aggravated by lying down, position and coughing. Stiffness is present in the morning. Associated symptoms include bladder incontinence, leg pain, numbness, paresis, paresthesias, tingling and weakness.  Pertinent negatives include no abdominal pain, bowel incontinence, chest pain, dysuria, fever, headaches, pelvic pain, perianal numbness or weight loss. Risk factors include history of steroid use, lack of exercise and sedentary lifestyle. He has tried NSAIDs, walking, ice, muscle relaxant, chiropractic manipulation, heat, home exercises, analgesics and bed rest for the symptoms. The treatment provided significant relief. Leg Pain   The incident occurred more than 1 week ago. There was no injury mechanism. The quality of the pain is described as aching. The pain is at a severity of 5/10. The pain is mild. The pain has been improving since onset. Associated symptoms include muscle weakness, numbness and tingling. He reports no foreign bodies present. The symptoms are aggravated by movement, palpation and weight bearing. He has tried rest, heat, elevation, acetaminophen, immobilization, ice, non-weight bearing and NSAIDs for the symptoms. The treatment provided moderate relief. Neurologic Problem  The patient's primary symptoms include an altered mental status, clumsiness, focal sensory loss, focal weakness, a loss of balance, a visual change and weakness. The patient's pertinent negatives include no memory loss, near-syncope or slurred speech. This is a chronic problem. The current episode started more than 1 year ago. The neurological problem developed insidiously. The problem has been gradually worsening since onset. There was no focality noted. Associated symptoms include back pain, bladder incontinence, confusion, dizziness and fatigue. Pertinent negatives include no abdominal pain, auditory change, aura, bowel incontinence, chest pain, diaphoresis, fever, headaches, light-headedness, nausea, neck pain, palpitations, shortness of breath or vomiting. Past treatments include walking, medication and acetaminophen. The treatment provided moderate relief. His past medical history is significant for mood changes.  There is no history of a bleeding disorder, a clotting disorder, a CVA, dementia, head trauma, liver disease or seizures. Past Medical History:   Diagnosis Date    Chronic bilateral low back pain with sciatica 12/10/2019    Mild L4-5 degenerative disc disease and minimal facet degenerative  changes at L5-S1.  Otherwise normal lumbosacral spine. Mild remote benign lower thoracic compression fractures and mild to  moderate thoracic degenerative disc disease.  Erectile dysfunction     ULISSES virus antibody positive 06/10/2021    [facility]    Multiple sclerosis ([address] 12/10/2019    Sees [facility] Date of onset: 2006-Left optic neuritis Date of diagnosis of MS: 2006-Diagnosed by Dr. Tristin Kirby course at onset: Relapsing-Remitting Current disease course: Relapsing-Remitting Previous disease therapies:  IVMP 2006 Copaxone-2006-2007 (stopped due to expense) IVMP 2008 (optic neuritis on right) IVMP -2015 (Left hand weaknes    OAB (overactive bladder) 06/15/2018    Spasticity 09/15/2015       History reviewed. No pertinent surgical history. Social History     Socioeconomic History    Marital status: Single     Spouse name: None    Number of children: None    Years of education: 15    Highest education level: Some college, no degree   Occupational History    Occupation: disabled dt MS   Tobacco Use    Smoking status: Former Smoker     Packs/day: 1.00     Quit date: 2021     Years since quittin.1    Smokeless tobacco: Never Used   Vaping Use    Vaping Use: Every day    Substances: Nicotine   Substance and Sexual Activity    Alcohol use: Not Currently    Drug use: Never    Sexual activity: None   Other Topics Concern    None   Social History Narrative    Social/Functional History     on SSDI for MS    Lives in [facility] with his twin brother and long term friend-Madan. He still pays most the bills. He was taken advantage of by the situation.     He grew up in [place] but is no longer [employer] and is agnostic if not in [place]. Still able to drive-but can't afford a car. 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 Social Determinants of Health     Financial Resource Strain:     Difficulty of Paying Living Expenses: Not on file   Food Insecurity:     Worried About Running Out of Food in the Last Year: Not on file    Triston of Food in the Last Year: Not on file   Transportation Needs:     Lack of Transportation (Medical): Not on file    Lack of Transportation (Non-Medical): Not on file   Physical Activity:     Days of Exercise per Week: Not on file    Minutes of Exercise per Session: Not on file   Stress:     Feeling of Stress : Not on file   Social Connections:     Frequency of Communication with Friends and Family: Not on file    Frequency of Social Gatherings with Friends and Family: Not on file    Attends Sabianist Services: Not on file    Active Member of [address] [place] [address] or Organizations: Not on file    Attends Club or Organization Meetings: Not on file    Marital Status: Not on file   Intimate Partner Violence:     Fear of Current or Ex-Partner: Not on file    Emotionally Abused: Not on file    Physically Abused: Not on file    Sexually Abused: Not on file   Housing Stability:     Unable to Pay for Housing in the Last Year: Not on file    Number of [place] in the Last Year: Not on file    Unstable Housing in the Last Year: Not on file       History reviewed. No pertinent family history. No Known Allergies    Review of Systems   Constitutional: Positive for activity change and fatigue. Negative for chills, diaphoresis, fever and weight loss. HENT: Negative for congestion, ear discharge, ear pain, hearing loss, nosebleeds, sore throat and tinnitus. Eyes: Negative for photophobia, pain and redness.    Respiratory: Negative for cough, shortness of breath, wheezing and stridor. Shortness of breath on exertion   Cardiovascular: Negative for chest pain, palpitations, leg swelling and near-syncope. Gastrointestinal: Positive for constipation. Negative for abdominal pain, blood in stool, bowel incontinence, diarrhea, nausea and vomiting. Endocrine: Negative for polydipsia. Genitourinary: Positive for bladder incontinence. Negative for dysuria, flank pain, frequency, hematuria, pelvic pain and urgency. Musculoskeletal: Positive for back pain, gait problem and myalgias. Negative for neck pain. Skin: Negative for rash. Allergic/Immunologic: Positive for immunocompromised state. Negative for environmental allergies. Neurological: Positive for dizziness, tingling, focal weakness, weakness, numbness, paresthesias and loss of balance. Negative for tremors, seizures, light-headedness and headaches. Hematological: Does not bruise/bleed easily. Psychiatric/Behavioral: Positive for confusion. Negative for dysphoric mood, hallucinations, memory loss and suicidal ideas. The patient is not nervous/anxious. Objective    There were no vitals filed for this visit.     No data recorded            PHYSICAL EXAMINATION:  [ INSTRUCTIONS:  \"[x]\" Indicates a positive item  \"[]\" Indicates a negative item  -- DELETE ALL ITEMS NOT EXAMINED]    [x] Alert  [x] Oriented to person/place/time      [x] No apparent distress  [x] Not toxic appearing    [x] Face complexion normal appearing [x] Sclera clear  [x] Lips are not cyanotic      [x] Breathing appears normal  [] Appears tachypneic      [] Rash on visible skin    [x] Cranial Nerves II-XII grossly intact    [x] Motor grossly intact in visible upper extremities, including stiff but intact range of motion in cervical, upper extremity and thoracic  [x] Motor grossly intact in visible lower extremities, including normal range of motion in the hips and knees for stiffness in the lumbar spine    [x] Normal Mood  [x] Not anxious appearing    [x] Not depressed appearing  [x] Not confused appearing      [x]  Good short term memory  [x]  Good long term memory    [x]  Able to follow 2-3 step commands    Due to this being a TeleHealth encounter, evaluation of the following organ systems is limited: Vitals/Constitutional/EENT/Resp/CV/GI//MS/Neuro/Skin/Heme-Lymph-Imm. ASSESSMENT/PLAN:     After a thorough review and discussion of the previous medical records, patient comprehensive medical, surgical, and family and social history, Review of Systems, their OARRS, their Screener and Opioid Assessment for Patients with Pain (SOAPP®-R), recent diagnostics, and symptomatic results to previous treatment, it is my impression that the patients is suffering with progressive and severe:     Diagnosis Orders   1. Neuropathic pain of both legs     2. Thalamic pain syndrome     3. Chronic pain of both shoulders     4. Chronic bilateral low back pain with left-sided sciatica         I am also concerned by lifestyle and mood issues including:    Past Medical History:   Diagnosis Date    Chronic bilateral low back pain with sciatica 12/10/2019    Mild L4-5 degenerative disc disease and minimal facet degenerative  changes at L5-S1.  Otherwise normal lumbosacral spine. Mild remote benign lower thoracic compression fractures and mild to  moderate thoracic degenerative disc disease.     Erectile dysfunction     ULISSES virus antibody positive 06/10/2021    [facility]    Multiple sclerosis ([facility] [address] 12/10/2019    Sees [facility] Date of onset: August 2006-Left optic neuritis Date of diagnosis of MS: September 2006-Diagnosed by Dr. Migdalia Pennington course at onset: Relapsing-Remitting Current disease course: Relapsing-Remitting Previous disease therapies:  IVMP August 2006 Copaxone-September 2006-January 2007 (stopped due to expense) IVMP July 2008 (optic neuritis on right) IVMP November 6-8, 2015 (Left hand weaknes    OAB (overactive bladder) 06/15/2018    Spasticity 09/15/2015 Given their medication, chronic pain and lifestyle and medications they are at risk for :    Falls, constipation, addiction, toxicity due to controlled/opiate medications  Loss of livelyhood due to severe pain, debility, weight gain and  vitamin D deficiency    The patient was educated regarding proper diet, fitness routine, and regulatory restrictions concerning pain medications. Previous notes, comprehensive past medical, surgical, family history, and diagnostics were reviewed. Patient education and councelling were provided regarding off label use,treatment options and medication and injection risks. Overall greater than 25 minutes spent in direct and indirect patient care. Current and old OARRS ([place] Automated Prescription Reporting System) records reviewed, all refills reviewed since last visit,  Behavioral agreement/RALPH regulations   and Toxicology screen was reviewed with patient and is up to date. There are no current red flags. They are making good progress regarding pain relief, they are performing at a functional level regarding activities of daily living, family interactions and psychological functioning, they're not having any adverse effects or side effects from the current medications, and I see no findings of aberrant drug taking or addiction related behaviors. The patient is aware that they have a chronic pain condition and they may require opiates dosing for life. All efforts will be made to wean to the lowest effective dose. Other therapies for pain have not been effective including nonopiate medications. Injections and exercises are only partially effective. A Rx for Narcan was offered to help prevent accidental overdose. RX Monitoring 9/27/2021   Acute Pain Prescriptions Prescription exceeds daily limit for a specific reason. See comments or note. ;Not required given exclusionary diagnoses. ..;Severe pain not adequately treated with lower dose.    Periodic Controlled Substance Monitoring Possible medication side effects, risk of tolerance/dependence & alternative treatments discussed. ;No signs of potential drug abuse or diversion identified. ;Assessed functional status. ;Obtaining appropriate analgesic effect of treatment. Chronic Pain > 50 MEDD Re-evaluated the status of the patient's underlying condition causing pain. ;Considered consultation with a specialist.;Obtained or confirmed \"Consent for Opioid Use\" on file. Patient is currently taking:       I am having Delicia Mendenhall \"[place]\" maintain his vitamin D, Ocrelizumab (OCREVUS IV), baclofen, and Xtampza ER. I also recommend the following Medications:    No orders of the defined types were placed in this encounter.       -which helps with pain and function. Otherwise, continue the current pain medications that I have prescibed. Radiologic:   Old films reviewed,     I discussed results with patients. see Follow up plans below  For any new studies. Care Everywhere Updates:  requested and reviewed. No new issues noted. Electrodiagnostic:  Previous studies requested,     I discussed results with patient. See follow-up plans for new studies. Additional history obtained from independent sourcing including patient's family and caregivers.     Labs:  Previous labs reviewed     No results found for: NA, K, CL, CO2, BUN, CREATININE, GLU, CALCIUM, LABALBU, BILITOT, ALKPHOS, AST, ALT  No results found for: WBC, RBC, HGB, HCT, MCV, MCH, MCHC, RDW, PLT, MPV    Lab Results   Component Value Date/Time    DSCOMMENT See Note 12/10/2019 04:58 PM       Lab Results   Component Value Date/Time    BUPREN Negative 12/10/2019 04:58 PM       No results found for: METPHEN, PHENTERMINE, BENZOYL, ALPRAZ, ALPHAOHALPRA, CLONAZEPAM, 7AMINOCLONAZ, DIAZEP, SAWYER, OXAZ, TEMAZ, LORAZEPAM, MIDAZOLAM, ZOLPIDEM, GRAHAM, ETG, MARIJMET, PCP, PAINMGTDRUGP, EERPAINMGTPA, LABCREA      , I discussed results with patient. See follow-up plans for new studies. Therapies:  HEP-gentle stretching and relaxation techniques-demonstrated with patient-they are to do them twice a day. They are also advised to make the following lifestyle changes:  Goals      SOAPP-R      12/10/19 score: 17- moderate risk  01/02/2020 score: 16 - moderate risk   1/22/20 score: 15- moderarte risk  2/28/20 score: 14- moderate risk    5- score - 12 - moderate risk             Injections or Epidurals:  Injection options were discussed. Monthly trigger point injections add vitamin B12 when able. Patient gave verbal consent to ordered injections. See follow-up plans for planned injections. Supplements:  Vitamin D with increased dosing during the rainy months, add co-Q10 for heart health. Education was given on:   Dietary and Fitness--daily stretches and low carb diet-in chair Yoga when possible      Note controlled medication/opiate drug therapy requires intensive monitoring for toxicity and addiction. Follow up with Primary Care Physician regarding their general medical needs. Stressed the importance of following up with PCP and specialists for his/her chronic diseases, health, CV, and cancer screening and continued care. Will follow disease activity/progression and adjust therapeutic regimen to disease activity and severity. Discussed medication dosage, usage, goals of therapy, and side effects. Available test results were reviewed -Discussed findings, impression and plan with patient. An additional  minutes were spent outside of the patient visit to review records. Additional time spent with the patient to discuss their questions. Additional time spent with the patient devoted to discussing treatment strategy, planning, and implementation generalized musculoskeletal health plan. Patient understands above plan; questions asked and answered. Patient agrees to plan as noted above.           On this date 6/30/22 I have spent 10 minutes reviewing previous notes, test results and face to face (virtual) with the patient discussing the diagnosis and importance of compliance with the treatment plan as well as documenting on the day of the visit. At least 50% of the visit was involved in the discussion of the options for treatment. We discussed exercises, medication, interventional therapies and surgery. Healthy life style is essential with patient hard work to achieve the wellness. In addition; discussion with the patient and/or family about any of the diagnostic results, impressions and/or recommended diagnostic studies, prognosis, risks and benefits of treatment options, instructions for treatment and/or follow-up, importance of compliance with chosen treatment options, risk-factor reduction, and patient/family education. They are to follow up in 2-2 1/2 months to review medication, efficacy of injections, pill counts, OARRS check, SOAPPR assessment, review diagnostics, to review previous and future treatment plans and assess appropriateness for continued therapy,  and regarding the efficacy of current plan and future treatment. New Diagnostics  No orders of the defined types were placed in this encounter. An  electronic signature was used to authenticate this note. -Dr Eufemia Casanova, DO       With MA assistance from:   Bety Coats MA     19}    Pursuant to the emergency declaration under the [facility], 1135 waiver authority and the [employer] Act, this Virtual  Visit was conducted, with patient's consent, to reduce the patient's risk of exposure to COVID-19 and provide continuity of care for an established patient. Services were provided through a video synchronous discussion virtually to substitute for in-person clinic visit.

## 2022-08-25 ENCOUNTER — TELEMEDICINE (OUTPATIENT)
Dept: PHYSICAL MEDICINE AND REHAB | Age: 37
End: 2022-08-25
Payer: MEDICARE

## 2022-08-25 DIAGNOSIS — M25.512 CHRONIC PAIN OF BOTH SHOULDERS: ICD-10-CM

## 2022-08-25 DIAGNOSIS — G89.29 CHRONIC BILATERAL THORACIC BACK PAIN: Primary | ICD-10-CM

## 2022-08-25 DIAGNOSIS — M25.511 CHRONIC PAIN OF BOTH SHOULDERS: ICD-10-CM

## 2022-08-25 DIAGNOSIS — G35 MULTIPLE SCLEROSIS (HCC): ICD-10-CM

## 2022-08-25 DIAGNOSIS — G89.0 THALAMIC PAIN SYNDROME: ICD-10-CM

## 2022-08-25 DIAGNOSIS — M54.6 CHRONIC BILATERAL THORACIC BACK PAIN: Primary | ICD-10-CM

## 2022-08-25 DIAGNOSIS — G89.29 CHRONIC PAIN OF BOTH SHOULDERS: ICD-10-CM

## 2022-08-25 PROCEDURE — 1036F TOBACCO NON-USER: CPT | Performed by: PHYSICAL MEDICINE & REHABILITATION

## 2022-08-25 PROCEDURE — G8421 BMI NOT CALCULATED: HCPCS | Performed by: PHYSICAL MEDICINE & REHABILITATION

## 2022-08-25 PROCEDURE — 99214 OFFICE O/P EST MOD 30 MIN: CPT | Performed by: PHYSICAL MEDICINE & REHABILITATION

## 2022-08-25 PROCEDURE — G8427 DOCREV CUR MEDS BY ELIG CLIN: HCPCS | Performed by: PHYSICAL MEDICINE & REHABILITATION

## 2022-08-25 RX ORDER — DALFAMPRIDINE 10 MG/1
TABLET, FILM COATED, EXTENDED RELEASE ORAL
COMMUNITY
Start: 2022-08-20

## 2022-08-25 RX ORDER — OXYCODONE 13.5 MG/1
1 CAPSULE, EXTENDED RELEASE ORAL EVERY 6 HOURS PRN
COMMUNITY
Start: 2022-06-17

## 2022-08-25 RX ORDER — OXYCODONE HYDROCHLORIDE AND ACETAMINOPHEN 5; 325 MG/1; MG/1
TABLET ORAL
COMMUNITY
Start: 2022-07-30 | End: 2022-08-25

## 2022-08-25 ASSESSMENT — ENCOUNTER SYMPTOMS
EYE REDNESS: 0
COUGH: 0
VISUAL CHANGE: 1
BACK PAIN: 1
ABDOMINAL PAIN: 0
SORE THROAT: 0
NAUSEA: 0
BLOOD IN STOOL: 0
CONSTIPATION: 1
WHEEZING: 0
PHOTOPHOBIA: 0
STRIDOR: 0
SHORTNESS OF BREATH: 0
EYE PAIN: 0
VOMITING: 0
BOWEL INCONTINENCE: 0
DIARRHEA: 0

## 2022-08-25 NOTE — PROGRESS NOTES
TELEHEALTH EVALUATION -- Audio/Visual (During DWAYS-72 public health emergency)    Due to COVID 19 outbreak, patient's office visit was converted to a virtual visit. Patient was contacted and agreed to proceed with a virtual visit via  [email or web address] me   The risks and benefits of converting to a virtual visit were discussed in light of the current infectious disease epidemic. Patient also understood that insurance coverage and co-pays are up to their individual insurance plans. Subjective       This patient has requested an audio/video evaluation for the following concern(s):    HPI:    Back Pain        Leg Pain bilateral       Medication Refill Xtampza ER, baclofen, Vit D       Pain 7- back without medication           25-year-old male here again to follow-up for pain due to severe progressive MS unfortunately has degenerative joint and back pain in the mid and low back as well as neuropathic pain in his hands and feet. He saw physician at [facility] and got a Rx for an opiate. He knows he should have called for refills--he breached opiate agreement. He is considering alternate care with Dr Randall Byers in [place] which I think would be good for him. Saw Dr Esther Trent at [facility] not think that surgery is the answer--dt MS comorbidity. Back Pain  This is a chronic problem. The current episode started more than 1 year ago. The problem occurs constantly. The problem is unchanged. The pain is present in the gluteal and lumbar spine. The quality of the pain is described as aching. The pain is at a severity of 8/10. The symptoms are aggravated by lying down, position and coughing. Stiffness is present In the morning. Associated symptoms include bladder incontinence, leg pain, numbness, paresis, paresthesias, tingling and weakness. Pertinent negatives include no abdominal pain, bowel incontinence, chest pain, dysuria, fever, headaches, pelvic pain, perianal numbness or weight loss.  Risk factors include history of steroid use, lack of exercise and sedentary lifestyle. He has tried NSAIDs, walking, ice, muscle relaxant, chiropractic manipulation, heat, home exercises, analgesics and bed rest for the symptoms. The treatment provided significant relief. Leg Pain   The incident occurred more than 1 week ago. There was no injury mechanism. The quality of the pain is described as aching. The pain is at a severity of 5/10. The pain is mild. The pain has been Improving since onset. Associated symptoms include muscle weakness, numbness and tingling. He reports no foreign bodies present. The symptoms are aggravated by movement, palpation and weight bearing. He has tried rest, heat, elevation, acetaminophen, immobilization, ice, non-weight bearing and NSAIDs for the symptoms. The treatment provided moderate relief. Neurologic Problem  The patient's primary symptoms include an altered mental status, clumsiness, focal sensory loss, focal weakness, a loss of balance, a visual change and weakness. The patient's pertinent negatives include no memory loss, near-syncope or slurred speech. This is a chronic problem. The current episode started more than 1 year ago. The neurological problem developed insidiously. The problem has been gradually worsening since onset. There was no focality noted. Associated symptoms include back pain, bladder incontinence, confusion, dizziness and fatigue. Pertinent negatives include no abdominal pain, auditory change, aura, bowel incontinence, chest pain, diaphoresis, fever, headaches, light-headedness, nausea, neck pain, palpitations, shortness of breath or vomiting. Past treatments include walking, medication and acetaminophen. The treatment provided moderate relief. His past medical history is significant for mood changes. There is no history of a bleeding disorder, a clotting disorder, a CVA, dementia, head trauma, liver disease or seizures.        Date of diagnosis of MS: September 2006-Diagnosed by  Kaylanajay    Relapsing-Remitting  Previous disease therapies: IVMP 2006  Copaxone-2006-2007 (stopped due to expense), IVMP 2008 (optic neuritis on right)    Past Medical History:   Diagnosis Date    Chronic bilateral low back pain with sciatica 12/10/2019    Mild L4-5 degenerative disc disease and minimal facet degenerative  changes at L5-S1. Otherwise normal lumbosacral spine. Mild remote benign lower thoracic compression fractures and mild to  moderate thoracic degenerative disc disease. Erectile dysfunction     ULISSES virus antibody positive 06/10/2021    [facility]    Multiple sclerosis ([address] 12/10/2019    Sees [facility] Date of onset: 2006-Left optic neuritis Date of diagnosis of MS: 2006-Diagnosed by Dr. Keren Evans course at onset: Relapsing-Remitting Current disease course: Relapsing-Remitting Previous disease therapies:  IVMP 2006 Copaxone-2006-2007 (stopped due to expense) IVMP 2008 (optic neuritis on right) IVMP -2015 (Left hand weaknes    OAB (overactive bladder) 06/15/2018    Spasticity 09/15/2015       History reviewed. No pertinent surgical history. Social History     Socioeconomic History    Marital status: Single     Spouse name: None    Number of children: None    Years of education: 12    Highest education level: Some college, no degree   Occupational History    Occupation: disabled dt MS   Tobacco Use    Smoking status: Former     Packs/day: 1.00     Types: Cigarettes     Quit date: 2021     Years since quittin.2    Smokeless tobacco: Never   Vaping Use    Vaping Use: Every day    Substances: Nicotine   Substance and Sexual Activity    Alcohol use: Not Currently    Drug use: Never   Social History Narrative    Social/Functional History     on SSDI for MS    Lives in [place] with his twin brother and long term friend-Madan. He still pays most the bills. He was taken advantage of by the situation.     He grew up in [employer] but is no longer [employer] and is agnostic if not in [place].    Still able to drive-but can't afford a car. 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 History reviewed. No pertinent family history. No Known Allergies    Review of Systems   Constitutional:  Positive for activity change and fatigue. Negative for chills, diaphoresis, fever and weight loss. HENT:  Negative for congestion, ear discharge, ear pain, hearing loss, nosebleeds, sore throat and tinnitus. Eyes:  Negative for photophobia, pain and redness. Respiratory:  Negative for cough, shortness of breath, wheezing and stridor. Shortness of breath on exertion   Cardiovascular:  Negative for chest pain, palpitations, leg swelling and near-syncope. Gastrointestinal:  Positive for constipation. Negative for abdominal pain, blood in stool, bowel incontinence, diarrhea, nausea and vomiting. Endocrine: Negative for polydipsia. Genitourinary:  Positive for bladder incontinence. Negative for dysuria, flank pain, frequency, hematuria, pelvic pain and urgency. Musculoskeletal:  Positive for back pain, gait problem and myalgias. Negative for neck pain. Skin:  Negative for rash. Allergic/Immunologic: Positive for immunocompromised state. Negative for environmental allergies. Neurological:  Positive for dizziness, tingling, focal weakness, weakness, numbness, paresthesias and loss of balance. Negative for tremors, seizures, light-headedness and headaches. Hematological:  Does not bruise/bleed easily. Psychiatric/Behavioral:  Positive for confusion and dysphoric mood. Negative for hallucinations, memory loss and suicidal ideas. The patient is not nervous/anxious. Objective    There were no vitals filed for this visit.     No data recorded            PHYSICAL EXAMINATION:  [ INSTRUCTIONS:  \"[x]\" Indicates a positive item  \"[]\" Indicates a negative item  -- DELETE ALL ITEMS NOT EXAMINED]    [x] Alert  [x] Oriented to person/place/time      [x] No apparent distress  [x] Not toxic appearing    [x] Face complexion normal appearing [x] Sclera clear  [x] Lips are not cyanotic      [x] Breathing appears normal  [] Appears tachypneic      [] Rash on visible skin    [x] Cranial Nerves II-XII grossly intact    [x] Motor grossly intact in visible upper extremities, including stiff but intact range of motion in cervical, upper extremity and thoracic  [x] Motor grossly intact in visible lower extremities, including normal range of motion in the hips and knees for stiffness in the lumbar spine    [x] Normal Mood  [x] Not anxious appearing    [x] Not depressed appearing  [x] Not confused appearing      [x]  Good short term memory  [x]  Good long term memory    [x]  Able to follow 2-3 step commands    Due to this being a TeleHealth encounter, evaluation of the following organ systems is limited: Vitals/Constitutional/EENT/Resp/CV/GI//MS/Neuro/Skin/Heme-Lymph-Imm. ASSESSMENT/PLAN:     After a thorough review and discussion of the previous medical records, patient comprehensive medical, surgical, and family and social history, Review of Systems, their OARRS, their Screener and Opioid Assessment for Patients with Pain (SOAPP®-R), recent diagnostics, and symptomatic results to previous treatment, it is my impression that the patients is suffering with progressive and severe:     Diagnosis Orders   1. Chronic bilateral thoracic back pain        2. Chronic pain of both shoulders        3. Thalamic pain syndrome        4.  Multiple sclerosis ([facility] [address]            I am also concerned by lifestyle and mood issues including:    Past Medical History:   Diagnosis Date    Chronic bilateral low back pain with sciatica 12/10/2019    Mild L4-5 degenerative disc disease and minimal facet degenerative changes at L5-S1. Otherwise normal lumbosacral spine. Mild remote benign lower thoracic compression fractures and mild to  moderate thoracic degenerative disc disease. Erectile dysfunction     ULISSES virus antibody positive 06/10/2021    [facility]    Multiple sclerosis ([address] 12/10/2019    Sees [facility] Date of onset: August 2006-Left optic neuritis Date of diagnosis of MS: September 2006-Diagnosed by Dr. Lan Ramirez course at onset: Relapsing-Remitting Current disease course: Relapsing-Remitting Previous disease therapies:  IVMP August 2006 Copaxone-September 2006-January 2007 (stopped due to expense) IVMP July 2008 (optic neuritis on right) IVMP November 6-8, 2015 (Left hand weaknes    OAB (overactive bladder) 06/15/2018    Spasticity 09/15/2015           Given their medication, chronic pain and lifestyle and medications they are at risk for :    Falls, constipation, addiction, toxicity due to controlled/opiate medications  Loss of livelyhood due to severe pain, debility, weight gain and  vitamin D deficiency    The patient was educated regarding proper diet, fitness routine, and regulatory restrictions concerning pain medications. Previous notes, comprehensive past medical, surgical, family history, and diagnostics were reviewed. Patient education and councelling were provided regarding off label use,treatment options and medication and injection risks. Overall greater than 25 minutes spent in direct and indirect patient care. Current and old OARRS ([place] Automated Prescription Reporting System) records reviewed, all refills reviewed since last visit,  Behavioral agreement/RALPH regulations   and Toxicology screen was reviewed with patient and is up to date. There are no current red flags.    They are making good progress regarding pain relief, they are performing at a functional level regarding activities of daily living, family interactions and psychological functioning, they're not having any adverse reviewed. [facility]Pain: Immediate pain relief may be due to the local anesthetic. It may take anywhere from 3-10 days for the steroids to take affect. Some patients may experience a flare up of their normal pain for the first 24-72 hours after the procedure. Take your pain medication as prescribed. You may experience soreness at the injection site. Rest quietly and treat your injection site with ice for 20 minutes at a time as needed. If you should have muscle spasms, treat the site with heat packs for 20 minutes at a time as needed. Only use heat and ice treatments for 20 minutes at a time as they may cause skin irritation. If you experience headaches, lie down as flat as possible and drink lots of fluids. Caffeinated fluids are the best to use. If problems occur that you feel needs a doctors attention, please call at once. Examples may include:   Swelling, redness, or drainage from injection site and/or persistent pain  Elevated temperature over 101 degrees  Intolerable headache  Sudden or increase in numbness or weakness in the legs or arms  Severe low back pain  Inability to control bladder or bowels (incontinence)    If you are unable to reach your doctor go to the nearest emergency room as soon as possible to be evaluated. --------------------------------------------------------------------------------  Ketamine/Lidocaine Infusions  Location: [facility], [facility] [address] [place] [address], Yessica Raza, [address] ([facility] [address])  Call: [phone] or [phone] to schedule     Ketamine and Lidocaine infusions are used to treat chronic neuropathic (nervous system-related) pain. This neuropathic pain is caused by nerves that have been injured, damaged or not functioning right.     Examples include:   Peripheral neuropathy  Fibromyalgia  Complex Regional Pain Syndrome  Post-herpetic neuralgia  Phantom limb pain  Pain following a stroke or a spinal cord injury  Chronic abdominal pain  Other pain disorders  Chronic pain that has not responded well to other treatments     Contents: Normal Saline, Lidocaine, Ketamine, Ketorolac, Propofol. Ketorolac can be omitted (removed). The medicine is delivered by intravenous (IV) infusion. It works by blocking pain receptors in the brain and spinal cord. This may help decrease chronic pain, especially for patients with neuropathic pain. Monitoring: Patients are monitored very closely (vital signs: heart rate, blood pressure, respirations, oxygenation) during and following the infusion. A nurse will be with you throughout the procedure. You must have someone with you to drive you home. No new issues noted. Electrodiagnostic:  Previous studies requested,     I discussed results with patient. See follow-up plans for new studies. Additional history obtained from independent sourcing including patient's family and caregivers. Labs:  Previous labs reviewed     No results found for: NA, K, CL, CO2, BUN, CREATININE, GLU, CALCIUM, LABALBU, BILITOT, ALKPHOS, AST, ALT  No results found for: WBC, RBC, HGB, HCT, MCV, MCH, MCHC, RDW, PLT, MPV    Lab Results   Component Value Date/Time    DSCOMMENT See Note 12/10/2019 04:58 PM       Lab Results   Component Value Date/Time    BUPREN Negative 12/10/2019 04:58 PM       No results found for: METPHEN, PHENTERMINE, BENZOYL, ALPRAZ, ALPHAOHALPRA, CLONAZEPAM, 7AMINOCLONAZ, DIAZEP, SAWYER, OXAZ, TEMAZ, LORAZEPAM, MIDAZOLAM, ZOLPIDEM, GRAHAM, ETG, MARIJMET, PCP, PAINMGTDRUGP, EERPAINMGTPA, LABCREA      , I discussed results with patient. See follow-up plans for new studies. Therapies:  HEP-gentle stretching and relaxation techniques-demonstrated with patient-they are to do them twice a day.   They are also advised to make the following lifestyle changes:   Goals        SOAPP-R      12/10/19 score: 17- moderate risk  01/02/2020 score: 16 - moderate risk   1/22/20 score: 15- moderarte risk  2/28/20 score: 14- moderate risk    5- score - 12 - moderate risk               Injections or Epidurals:  Injection options were discussed. Getting injections with a doctor in [address] [place] [address]. Monthly trigger point injections add vitamin B12 when able. Patient gave verbal consent to ordered injections. See follow-up plans for planned injections. Supplements:  Vitamin D with increased dosing during the rainy months, add co-Q10 for heart health. Education was given on:   Dietary and Fitness--daily stretches and low carb diet-in chair Yoga when possible      Note controlled medication/opiate drug therapy requires intensive monitoring for toxicity and addiction. Follow up with Primary Care Physician regarding their general medical needs. Stressed the importance of following up with PCP and specialists for his/her chronic diseases, health, CV, and cancer screening and continued care. Will follow disease activity/progression and adjust therapeutic regimen to disease activity and severity. Discussed medication dosage, usage, goals of therapy, and side effects. Available test results were reviewed -Discussed findings, impression and plan with patient. An additional  minutes were spent outside of the patient visit to review records. Additional time spent with the patient to discuss their questions. Additional time spent with the patient devoted to discussing treatment strategy, planning, and implementation generalized musculoskeletal health plan. Patient understands above plan; questions asked and answered. Patient agrees to plan as noted above. On this date  8/25/22 I have spent 10 minutes reviewing previous notes, test results and face to face (virtual) with the patient discussing the diagnosis and importance of compliance with the treatment plan as well as documenting on the day of the visit. At least 50% of the visit was involved in the discussion of the options for treatment.  We discussed exercises, medication, interventional therapies and surgery. Healthy life style is essential with patient hard work to achieve the wellness. In addition; discussion with the patient and/or family about any of the diagnostic results, impressions and/or recommended diagnostic studies, prognosis, risks and benefits of treatment options, instructions for treatment and/or follow-up, importance of compliance with chosen treatment options, risk-factor reduction, and patient/family education. They are to follow up in 2-2 1/2 months to review medication, efficacy of injections, pill counts, OARRS check, SOAPPR assessment, review diagnostics, to review previous and future treatment plans and assess appropriateness for continued therapy,  and regarding the efficacy of current plan and future treatment. New Diagnostics  No orders of the defined types were placed in this encounter. An  electronic signature was used to authenticate this note. -Dr Austen Chavez, DO       With MA assistance from:   Yennifer Mcknight MA     19}    Pursuant to the emergency declaration under the [facility], [facility] waiver authority and the [employer] and [employer] Act, this Virtual  Visit was conducted, with patient's consent, to reduce the patient's risk of exposure to COVID-19 and provide continuity of care for an established patient. Services were provided through a video synchronous discussion virtually to substitute for in-person clinic visit.